# Patient Record
Sex: FEMALE | Race: WHITE | Employment: UNEMPLOYED | URBAN - METROPOLITAN AREA
[De-identification: names, ages, dates, MRNs, and addresses within clinical notes are randomized per-mention and may not be internally consistent; named-entity substitution may affect disease eponyms.]

---

## 2017-03-29 ENCOUNTER — HOSPITAL ENCOUNTER (OUTPATIENT)
Dept: ULTRASOUND IMAGING | Age: 52
Discharge: HOME | End: 2017-03-29
Attending: FAMILY MEDICINE
Payer: MEDICAID

## 2017-03-29 DIAGNOSIS — R10.9 ABDOMINAL PAIN: ICD-10-CM

## 2017-03-29 DIAGNOSIS — R14.0 ABDOMINAL DISTENTION: ICD-10-CM

## 2017-03-29 PROCEDURE — 76700 US EXAM ABDOM COMPLETE: CPT

## 2017-04-17 ENCOUNTER — TELEPHONE (OUTPATIENT)
Dept: SURGERY | Age: 52
End: 2017-04-17

## 2017-04-17 ENCOUNTER — OFFICE VISIT (OUTPATIENT)
Dept: SURGERY | Age: 52
End: 2017-04-17

## 2017-04-17 VITALS
BODY MASS INDEX: 37.17 KG/M2 | DIASTOLIC BLOOD PRESSURE: 91 MMHG | SYSTOLIC BLOOD PRESSURE: 130 MMHG | OXYGEN SATURATION: 95 % | HEART RATE: 96 BPM | RESPIRATION RATE: 18 BRPM | TEMPERATURE: 98.5 F | HEIGHT: 67 IN | WEIGHT: 236.8 LBS

## 2017-04-17 DIAGNOSIS — R10.30 LOWER ABDOMINAL PAIN: ICD-10-CM

## 2017-04-17 DIAGNOSIS — K80.20 GALLSTONES: Primary | ICD-10-CM

## 2017-04-17 RX ORDER — ALPRAZOLAM 2 MG/1
TABLET ORAL
Refills: 0 | COMMUNITY
Start: 2017-04-13 | End: 2018-03-06

## 2017-04-17 RX ORDER — HALOPERIDOL 10 MG/1
TABLET ORAL
Refills: 0 | COMMUNITY
Start: 2017-04-02 | End: 2018-03-06

## 2017-04-17 RX ORDER — METHYLPHENIDATE HYDROCHLORIDE 20 MG/1
TABLET ORAL
Refills: 0 | COMMUNITY
Start: 2017-03-16 | End: 2020-01-01

## 2017-04-17 NOTE — MR AVS SNAPSHOT
Visit Information Date & Time Provider Department Dept. Phone Encounter #  
 4/17/2017 10:00 AM Col[facility] Castellon [facility] [phone] [id number] Upcoming Health Maintenance Date Due DTaP/Tdap/Td series (1 - Tdap) 12/29/1987 PAP AKA CERVICAL CYTOLOGY 12/29/1987 INFLUENZA AGE 9 TO ADULT 8/1/2016 BREAST CANCER SCRN MAMMOGRAM 12/29/2016 FOBT Q 1 YEAR AGE 50-75 12/29/2016 Allergies as of 4/17/2017  Review Complete On: 4/17/2017 By: Vidhi Vargas Severity Noted Reaction Type Reactions Erythromycin  04/17/2017    Angioedema Current Immunizations  Never Reviewed No immunizations on file. Not reviewed this visit Vitals BP Pulse Temp Resp Height(growth percentile) Weight(growth percentile) (!) 130/91 (BP 1 Location: Right arm, BP Patient Position: Sitting) 96 98.5 °F (36.9 °C) (Oral) 18 5' 7\" (1.702 m) 236 lb 12.8 oz (107.4 kg) LMP SpO2 BMI OB Status Smoking Status 04/07/2017 (Approximate) 95% 37.09 kg/m2 Having regular periods Current Every Day Smoker BMI and BSA Data Body Mass Index Body Surface Area 37.09 kg/m 2 2.25 m 2 Your Updated Medication List  
  
   
This list is accurate as of: 4/17/17 10:54 AM.  Always use your most recent med list.  
  
  
  
  
 ABILIFY 9.75 mg/1.3 mL injection Generic drug:  ARIPiprazole  
by IntraMUSCular route daily. ALPRAZolam 2 mg tablet Commonly known as:  XANAX  
  
 haloperidol 10 mg tablet Commonly known as:  HALDOL  
take 2 tablets by mouth at bedtime  
  
 methylphenidate 20 mg tablet Commonly known as:  RITALIN  
take 1 tablet by mouth twice a day for 30 DAYS Patient Instructions If you have any questions or concerns about today's appointment, the verbal and/or written instructions you were given for follow up care, please call our office at [phone]. [facility] [place] [id number] [place] [address], [address] 
 
[phone] office [phone] fax Introducing [facility]! [employer] introduces [email or web address] patient portal. Now you can access parts of your medical record, email your doctor's office, and request medication refills online. 1. In your internet browser, go to [email or web address] [email or web address]. WiN MS/Ad Summos 2. Click on the First Time User? Click Here link in the Sign In box. You will see the New Member Sign Up page. 3. Enter your [email or web address] Access Code exactly as it appears below. You will not need to use this code after youve completed the sign-up process. If you do not sign up before the expiration date, you must request a new code. · [email or web address] Access Code: [email or web address] Expires: 7/16/2017  7:43 AM 
 
4. Enter the last four digits of your Social Security Number (xxxx) and Date of Birth (mm/dd/yyyy) as indicated and click Submit. You will be taken to the next sign-up page. 5. Create a [email or web address] ID. This will be your [email or web address] login ID and cannot be changed, so think of one that is secure and easy to remember. 6. Create a [email or web address] password. You can change your password at any time. 7. Enter your Password Reset Question and Answer. This can be used at a later time if you forget your password. 8. Enter your e-mail address. You will receive e-mail notification when new information is available in [address]. 9. Click Sign Up. You can now view and download portions of your medical record. 10. Click the Download Summary menu link to download a portable copy of your medical information. If you have questions, please visit the Frequently Asked Questions section of the [email or web address] website. Remember, [email or web address] is NOT to be used for urgent needs. For medical emergencies, dial 911. Now available from your iPhone and Android! Please provide this summary of care documentation to your next provider. Your primary care clinician is listed as PANKAJ FEE. If you have any questions after today's visit, please call [phone].

## 2017-04-17 NOTE — PROGRESS NOTES
General Surgery Consult    Cherry Dunlap  Admit date: (Not on file)    MRN: [id number]     : 1965     Age: 49 y.o. Attending Physician: Kathleen Escalera MD, [facility]      History of Present Illness:      Contreras Gaitan is a 49 y.o. female who is mentally retarded who is presenting with abdominal pain. When asked the patient denies any pain. She said she had some pain in the past but never in the right upper quadrant. He r pain happen about once a month and it seems to be either in lower abdomen or epigastric area. She denies nausea or vomiting. Her sister and care giver are with her. She had an U/S that showed gallstones but no sign of cholecystitis. There are no active problems to display for this patient. Past Medical History:   Diagnosis Date    Depression     Psychotic disorder       Past Surgical History:   Procedure Laterality Date    BREAST SURGERY PROCEDURE UNLISTED Bilateral 2000    BREAST AUGMENTATION      Social History   Substance Use Topics    Smoking status: Current Every Day Smoker     Types: Cigarettes    Smokeless tobacco: Never Used    Alcohol use Not on file      History   Smoking Status    Current Every Day Smoker    Types: Cigarettes   Smokeless Tobacco    Never Used     Family History   Problem Relation Age of Onset    Heart Disease Mother     Cancer Mother     Prostate Cancer Father     Cancer Father       Current Outpatient Prescriptions   Medication Sig    ALPRAZolam Alli Jaylon) 2 mg tablet     haloperidol (HALDOL) 10 mg tablet take 2 tablets by mouth at bedtime    methylphenidate (RITALIN) 20 mg tablet take 1 tablet by mouth twice a day for 30 DAYS    ARIPiprazole (ABILIFY) 9.75 mg/1.3 mL injection by IntraMUSCular route daily. No current facility-administered medications for this visit. Allergies   Allergen Reactions    Erythromycin Angioedema        Review of Systems:  Review of systems not obtained due to patient factors.     Objective: Visit Vitals    BP (!) 130/91 (BP 1 Location: Right arm, BP Patient Position: Sitting)    Pulse 96    Temp 98.5 °F (36.9 °C) (Oral)    Resp 18    Ht 5' 7\" (1.702 m)    Wt 107.4 kg (236 lb 12.8 oz)    LMP 04/07/2017 (Approximate)    SpO2 95%    BMI 37.09 kg/m2       Physical Exam:      General:  in no apparent distress   Eyes:  conjunctivae and sclerae normal, pupils equal, round, reactive to light   Throat & Neck: no erythema or exudates noted and neck supple and symmetrical; no palpable masses   Lungs:   clear to auscultation bilaterally   Heart:  Regular rate and rhythm   Abdomen:   rounded and obese, soft, nontender, nondistended, no masses or organomegaly. No hernias. Extremities: extremities normal, atraumatic, no cyanosis or edema   Skin: Normal.       Imaging and Lab Review:     CBC: No results found for: WBC, RBC, HGB, HCT, PLT, HGBEXT, HCTEXT, PLTEXT  BMP: No results found for: GLU, NA, K, CL, CO2, BUN, CREA, CA  CMP:No results found for: GLU, NA, K, CL, CO2, BUN, CREA, CA, AGAP, BUCR, TBIL, GPT, AP, TP, ALB, GLOB, AGRAT    No results found for this or any previous visit (from the past 24 hour(s)). images and reports reviewed    Assessment:   Amelia Sullivan is a 49 y.o. female is presenting with abdominal pain that is very non-specific and very occasional. I don't think her pain is related to her gallstones. Her U/S did not show any evidence of cholecystitis. I think it is safer not to do the surgery. We can do a HIDA scan if we are still suspicious of that.     Plan:     No need for any surgical intervention currently  If pain continue and is in the right upper quadrant, than we can order a HIDA scan  Follow up with me as needed     Please call me if you have any questions (cell phone: [phone])     Signed By: Vicky Iyer MD     April 17, 2017

## 2017-04-17 NOTE — PATIENT INSTRUCTIONS
If you have any questions or concerns about today's appointment, the verbal and/or written instructions you were given for follow up care, please call our office at [phone].     [facility] - [address]    [phone] office  [phone] fax

## 2017-04-17 NOTE — PROGRESS NOTES
Patient is a 49 y.o. female who presents for a surgical evaluation of cholelithiasis, however, patient denies any abdominal pain. Patient is accompanied by her in home caretaker & sister for today's visit.

## 2017-04-17 NOTE — LETTER
4/17/2017 10:30 AM 
 
Patient:  Abdias Iqbal YOB: 1965 Date of Visit: 4/17/2017 Darshana Roberts MD 
[address] [facility] [address] [address] VIA Facsimile: [phone] Dear Darshana Roberts MD, Thank you for referring Ms. Jayna Dunlap to [place] [address] for evaluation and treatment. Below are the relevant portions of my assessment and plan of care. Thank you very much for your referral of Ms. Jayna Dunlap. If you have questions, please do not hesitate to call me. I look forward to following Ms. Dunlap along with you and will keep you updated as to her progress. Sincerely, Tino rCuz MD

## 2018-01-12 ENCOUNTER — APPOINTMENT (OUTPATIENT)
Dept: GENERAL RADIOLOGY | Age: 53
End: 2018-01-12
Attending: EMERGENCY MEDICINE
Payer: MEDICAID

## 2018-01-12 ENCOUNTER — HOSPITAL ENCOUNTER (EMERGENCY)
Age: 53
Discharge: HOME | End: 2018-01-12
Attending: EMERGENCY MEDICINE
Payer: MEDICAID

## 2018-01-12 VITALS
DIASTOLIC BLOOD PRESSURE: 103 MMHG | HEART RATE: 92 BPM | RESPIRATION RATE: 16 BRPM | SYSTOLIC BLOOD PRESSURE: 148 MMHG | TEMPERATURE: 98.3 F | OXYGEN SATURATION: 96 %

## 2018-01-12 DIAGNOSIS — S62.642A CLOSED NONDISPLACED FRACTURE OF PROXIMAL PHALANX OF RIGHT MIDDLE FINGER, INITIAL ENCOUNTER: Primary | ICD-10-CM

## 2018-01-12 PROCEDURE — 99283 EMERGENCY DEPT VISIT LOW MDM: CPT

## 2018-01-12 PROCEDURE — 73140 X-RAY EXAM OF FINGER(S): CPT

## 2018-01-12 PROCEDURE — 77030008323 HC SPLNT FNGR GTR DJOR -A

## 2018-01-12 PROCEDURE — 74011250637 HC RX REV CODE- 250/637: Performed by: EMERGENCY MEDICINE

## 2018-01-12 RX ORDER — ZOLPIDEM TARTRATE 10 MG/1
5 TABLET ORAL
COMMUNITY
End: 2018-03-06

## 2018-01-12 RX ORDER — HYDROCODONE BITARTRATE AND ACETAMINOPHEN 5; 325 MG/1; MG/1
1 TABLET ORAL
Status: COMPLETED | OUTPATIENT
Start: 2018-01-12 | End: 2018-01-12

## 2018-01-12 RX ORDER — TRAMADOL HYDROCHLORIDE 50 MG/1
50 TABLET ORAL
Qty: 6 TAB | Refills: 0 | Status: SHIPPED | OUTPATIENT
Start: 2018-01-12 | End: 2018-03-06

## 2018-01-12 RX ADMIN — HYDROCODONE BITARTRATE AND ACETAMINOPHEN 1 TABLET: 5; 325 TABLET ORAL at 20:00

## 2018-01-13 NOTE — DISCHARGE INSTRUCTIONS
Finger Fracture: Care Instructions  Your Care Instructions    Breaks in the bones of the finger usually heal well in about 3 to 4 weeks. The pain and swelling from a broken finger can last for weeks. But it should steadily improve, starting a few days after you break it. It is very important that you wear and take care of the cast or splint exactly as your doctor tells you to so that your finger heals properly and does not end up crooked. Wearing a splint may interfere with your normal activities. Ask for help with daily tasks if you need it. You heal best when you take good care of yourself. Eat a variety of healthy foods, and don't smoke. Follow-up care is a key part of your treatment and safety. Be sure to make and go to all appointments, and call your doctor if you are having problems. It's also a good idea to know your test results and keep a list of the medicines you take. How can you care for yourself at home? · If your doctor put a splint on your finger, wear the splint exactly as directed. Do not remove it until your doctor says that you can. · Keep your hand raised above the level of your heart as much as you can. This will help reduce swelling. · Put ice or a cold pack on your finger for 10 to 20 minutes at a time. Try to do this every 1 to 2 hours for the next 3 days (when you are awake) or until the swelling goes down. Put a thin cloth between the ice and your skin. Keep the splint dry. · Be safe with medicines. Take pain medicines exactly as directed. ¨ If the doctor gave you a prescription medicine for pain, take it as prescribed. ¨ If you are not taking a prescription pain medicine, ask your doctor if you can take an over-the-counter medicine. When should you call for help? Call 911 anytime you think you may need emergency care. For example, call if:  ? · Your finger is cool or pale or changes color.    ?Call your doctor now or seek immediate medical care if:  ? · Your pain gets much worse.   ? · You have tingling, weakness, or numbness in your finger. ? · You have signs of infection, such as:  ¨ Increased pain, swelling, warmth, or redness. ¨ Red streaks leading from the area. ¨ Pus draining from the area. ¨ Swollen lymph nodes in your neck, armpits, or groin. ¨ A fever. ? Watch closely for changes in your health, and be sure to contact your doctor if:  ? · Your finger is not steadily improving. Where can you learn more? Go to http://margaret-ilene.info/. Enter Q394 in the search box to learn more about \"Finger Fracture: Care Instructions. \"  Current as of: March 21, 2017  Content Version: 11.4  © 6427-4868 [employer]. Care instructions adapted under license by [employer] (which disclaims liability or warranty for this information). If you have questions about a medical condition or this instruction, always ask your healthcare professional. [place] [address] any warranty or liability for your use of this information.

## 2018-01-13 NOTE — ED PROVIDER NOTES
EMERGENCY DEPARTMENT HISTORY AND PHYSICAL EXAM    7:53 PM      Date: 1/12/2018  Patient Name: [facility]    History of Presenting Illness     Chief Complaint   Patient presents with    Finger Pain         History Provided By: Patient    Chief Complaint: finger pain   Duration: several  Minutes PTA  Timing:  Constant  Location: right third digit   Quality: Aching  Severity: Moderate  Associated Symptoms: denies any other associated signs or symptoms      Additional History (Context): Candi Fabry Summs, a 47 y.o. Female, current 2ppd smoker, non-substance abuser with h/o depression, presents with constant, moderate severity, aching right third digit pain s/p landing on her finger after a mechanical fall just PTA. Fall occurred due to tripping. Pt does not have associated sx. She has not tried OTC medications PTA. No other complaints at this time. PCP: Richard Umaña MD    Current Outpatient Prescriptions   Medication Sig Dispense Refill    zolpidem (AMBIEN) 10 mg tablet Take 5 mg by mouth nightly as needed for Sleep.  traMADol (ULTRAM) 50 mg tablet Take 1 Tab by mouth every six (6) hours as needed for Pain. Max Daily Amount: 200 mg. 6 Tab 0    ALPRAZolam (XANAX) 2 mg tablet   0    haloperidol (HALDOL) 10 mg tablet take 2 tablets by mouth at bedtime  0    methylphenidate (RITALIN) 20 mg tablet take 1 tablet by mouth twice a day for 30 DAYS  0    ARIPiprazole (ABILIFY) 9.75 mg/1.3 mL injection by IntraMUSCular route daily.          Past History     Past Medical History:  Past Medical History:   Diagnosis Date    Depression     Psychotic disorder        Past Surgical History:  Past Surgical History:   Procedure Laterality Date    BREAST SURGERY PROCEDURE UNLISTED Bilateral 2000    BREAST AUGMENTATION       Family History:  Family History   Problem Relation Age of Onset    Heart Disease Mother     Cancer Mother     Prostate Cancer Father     Cancer Father        Social History:  Social History Substance Use Topics    Smoking status: Current Every Day Smoker     Packs/day: 2.00     Types: Cigarettes    Smokeless tobacco: Never Used    Alcohol use No       Allergies: Allergies   Allergen Reactions    Erythromycin Angioedema         Review of Systems     Review of Systems   Constitutional: Negative for chills and fever. HENT: Negative for ear pain and sore throat. Eyes: Negative for pain and visual disturbance. Respiratory: Negative for cough and shortness of breath. Cardiovascular: Negative for chest pain and palpitations. Gastrointestinal: Negative for abdominal pain, diarrhea, nausea and vomiting. Genitourinary: Negative for flank pain. Musculoskeletal: Negative for back pain and neck pain. Right third digit pain   No swelling   Neurological: Negative for syncope and headaches. Psychiatric/Behavioral: Negative for agitation. The patient is not nervous/anxious. All other systems reviewed and are negative. Physical Exam     Visit Vitals    BP (!) 148/103 (BP 1 Location: Left arm, BP Patient Position: At rest)    Pulse 92    Temp 98.3 °F (36.8 °C)    Resp 16    LMP 12/22/2017    SpO2 96%       Physical Exam   Constitutional: She appears well-developed and well-nourished. HENT:   Head: Normocephalic and atraumatic. Eyes: Conjunctivae are normal. No scleral icterus. Neck: Normal range of motion. Neck supple. No JVD present. Cardiovascular: Normal rate, regular rhythm and intact distal pulses. Neurovascularly intact right third digit    Pulmonary/Chest: Effort normal. No respiratory distress. Musculoskeletal: Normal range of motion. pain to proximal phalanx right 3rd digit  neurovascularly intact     Neurological: She is alert. Skin: Skin is warm and dry. Psychiatric: Judgment and thought content normal.   Nursing note and vitals reviewed.     Diagnostic Study Results     Radiologic Studies -   XR 3RD FINGER RT MIN 2 V    (Results Pending)     XR 3RD FINGER RT MIN 2 V  --------------------------------------------------------------------  Impression by Dr. Jeannette Vasques at 8:30 PM  3rd digit, proximinal phalanx ; longitudinal and oblique fracture    Medical Decision Making   I am the first provider for this patient. I reviewed the vital signs, available nursing notes, past medical history, past surgical history, family history and social history. Vital Signs-Reviewed the patient's vital signs. O2 saturation of 96% on room air---Non-hypoxic     ED Course: Progress Notes, Reevaluation, and Consults:  Provider Notes (Medical Decision Making):  will get film to evaluate for fracture, possible sprain      9:01 PM fracture seen, discussed with pt, will place splint, tramadol #6 for pain.  reviewed, no narcotics, but takes xanax 4 mg daily and ambien 10 mg. So use caution with narcotics. referal to ortho. Questions answered and they're happy with the plan. Diagnosis     Clinical Impression:   1. Closed nondisplaced fracture of proximal phalanx of right middle finger, initial encounter        Disposition: home    Follow-up Information     Follow up With Details [id number]   [address] [place] [address] [phone]             Patient's Medications   Start Taking    TRAMADOL (ULTRAM) 50 MG TABLET    Take 1 Tab by mouth every six (6) hours as needed for Pain. Max Daily Amount: 200 mg. Continue Taking    ALPRAZOLAM (XANAX) 2 MG TABLET        ARIPIPRAZOLE (ABILIFY) 9.75 MG/1.3 ML INJECTION    by IntraMUSCular route daily. HALOPERIDOL (HALDOL) 10 MG TABLET    take 2 tablets by mouth at bedtime    METHYLPHENIDATE (RITALIN) 20 MG TABLET    take 1 tablet by mouth twice a day for 30 DAYS    ZOLPIDEM (AMBIEN) 10 MG TABLET    Take 5 mg by mouth nightly as needed for Sleep.    These Medications have changed    No medications on file   Stop Taking    No medications on file _______________________________    Attestations:  Scribe Attestation     Helena Minerva acting as a scribe for and in the presence of Cliff Ariza MD      January 12, 2018 at 7:53 PM       Provider Attestation:      I personally performed the services described in the documentation, reviewed the documentation, as recorded by the scribe in my presence, and it accurately and completely records my words and actions.  January 12, 2018 at 7:53 PM - Cliff Ariza MD    _______________________________

## 2018-03-03 ENCOUNTER — HOSPITAL ENCOUNTER (EMERGENCY)
Age: 53
Discharge: ANOTHER HOSPITAL | End: 2018-03-04
Attending: EMERGENCY MEDICINE
Payer: MEDICAID

## 2018-03-03 DIAGNOSIS — F13.20: ICD-10-CM

## 2018-03-03 DIAGNOSIS — F25.9 SCHIZOAFFECTIVE DISORDER, UNSPECIFIED TYPE (HCC): Primary | ICD-10-CM

## 2018-03-03 LAB
ANION GAP SERPL CALC-SCNC: 6 MMOL/L (ref 3–18)
BASOPHILS # BLD: 0.1 K/UL (ref 0–0.06)
BASOPHILS NFR BLD: 0 % (ref 0–2)
BUN SERPL-MCNC: 11 MG/DL (ref 7–18)
BUN/CREAT SERPL: 14 (ref 12–20)
CALCIUM SERPL-MCNC: 8.7 MG/DL (ref 8.5–10.1)
CHLORIDE SERPL-SCNC: 106 MMOL/L (ref 100–108)
CO2 SERPL-SCNC: 28 MMOL/L (ref 21–32)
CREAT SERPL-MCNC: 0.77 MG/DL (ref 0.6–1.3)
DIFFERENTIAL METHOD BLD: ABNORMAL
EOSINOPHIL # BLD: 0.3 K/UL (ref 0–0.4)
EOSINOPHIL NFR BLD: 2 % (ref 0–5)
ERYTHROCYTE [DISTWIDTH] IN BLOOD BY AUTOMATED COUNT: 12.6 % (ref 11.6–14.5)
ETHANOL SERPL-MCNC: <3 MG/DL (ref 0–3)
GLUCOSE SERPL-MCNC: 121 MG/DL (ref 74–99)
HCT VFR BLD AUTO: 46.3 % (ref 35–45)
HGB BLD-MCNC: 16.2 G/DL (ref 12–16)
LYMPHOCYTES # BLD: 3.8 K/UL (ref 0.9–3.6)
LYMPHOCYTES NFR BLD: 31 % (ref 21–52)
MCH RBC QN AUTO: 33.3 PG (ref 24–34)
MCHC RBC AUTO-ENTMCNC: 35 G/DL (ref 31–37)
MCV RBC AUTO: 95.1 FL (ref 74–97)
MONOCYTES # BLD: 0.8 K/UL (ref 0.05–1.2)
MONOCYTES NFR BLD: 7 % (ref 3–10)
NEUTS SEG # BLD: 7.2 K/UL (ref 1.8–8)
NEUTS SEG NFR BLD: 60 % (ref 40–73)
PLATELET # BLD AUTO: 237 K/UL (ref 135–420)
PMV BLD AUTO: 11.1 FL (ref 9.2–11.8)
POTASSIUM SERPL-SCNC: 3.9 MMOL/L (ref 3.5–5.5)
RBC # BLD AUTO: 4.87 M/UL (ref 4.2–5.3)
SODIUM SERPL-SCNC: 140 MMOL/L (ref 136–145)
WBC # BLD AUTO: 12.1 K/UL (ref 4.6–13.2)

## 2018-03-03 PROCEDURE — 99285 EMERGENCY DEPT VISIT HI MDM: CPT

## 2018-03-03 PROCEDURE — 74011250637 HC RX REV CODE- 250/637: Performed by: PHYSICIAN ASSISTANT

## 2018-03-03 PROCEDURE — 85025 COMPLETE CBC W/AUTO DIFF WBC: CPT

## 2018-03-03 PROCEDURE — 80048 BASIC METABOLIC PNL TOTAL CA: CPT

## 2018-03-03 PROCEDURE — 80307 DRUG TEST PRSMV CHEM ANLYZR: CPT

## 2018-03-03 RX ORDER — IBUPROFEN 200 MG
1 TABLET ORAL DAILY
Status: DISCONTINUED | OUTPATIENT
Start: 2018-03-03 | End: 2018-03-04 | Stop reason: HOSPADM

## 2018-03-03 RX ORDER — LORAZEPAM 1 MG/1
2 TABLET ORAL
Status: DISCONTINUED | OUTPATIENT
Start: 2018-03-03 | End: 2018-03-04 | Stop reason: HOSPADM

## 2018-03-03 NOTE — ED PROVIDER NOTES
HPI Comments: Patient is a 48 y/o female w/ PMH Schizoaffective D/O, depression, who presents to the ER via EMS requesting detox from Xanax. Per EMS, pt verbalized suicidal ideations while transporting. Patient states she took 5-6 Xanax pills just prior to calling EMS. Patient states she takes her medications too often, just to try and sleep more. Patient denied any SI/HI on exam, and has no other physical complaints. Patient states she is taking her other psych meds as prescribed. She has no pain. She denied any drugs or alcohol and has no other symptoms or complaints. The history is provided by the patient. Past Medical History:   Diagnosis Date    Depression     Psychotic disorder        Past Surgical History:   Procedure Laterality Date    BREAST SURGERY PROCEDURE UNLISTED Bilateral 2000    BREAST AUGMENTATION         Family History:   Problem Relation Age of Onset    Heart Disease Mother     Cancer Mother     Prostate Cancer Father     Cancer Father        Social History     Social History    Marital status: SINGLE     Spouse name: N/A    Number of children: N/A    Years of education: N/A     Occupational History    Not on file. Social History Main Topics    Smoking status: Current Every Day Smoker     Packs/day: 2.00     Types: Cigarettes    Smokeless tobacco: Never Used    Alcohol use No    Drug use: No    Sexual activity: Not on file     Other Topics Concern    Not on file     Social History Narrative         ALLERGIES: Erythromycin    Review of Systems   Constitutional: Negative for chills, fatigue and fever. HENT: Negative. Negative for sore throat. Eyes: Negative. Respiratory: Negative for cough and shortness of breath. Cardiovascular: Negative for chest pain and palpitations. Gastrointestinal: Negative for abdominal pain, nausea and vomiting. Genitourinary: Negative for dysuria. Musculoskeletal: Negative. Skin: Negative.     Neurological: Negative for dizziness, weakness, light-headedness and headaches. Psychiatric/Behavioral:        Requesting detox for xanax abuse   All other systems reviewed and are negative. Vitals:    03/03/18 1915 03/03/18 1930 03/03/18 1945 03/03/18 2000   BP: 117/82 135/82 124/86 150/72   Pulse:       Resp:       Temp:       SpO2: 94% 93% 93% 93%            Physical Exam   Constitutional: She is oriented to person, place, and time. She appears well-developed and well-nourished. No distress. Pt very slow to respond, withdrawn   HENT:   Head: Normocephalic and atraumatic. Mouth/Throat: Oropharynx is clear and moist.   Eyes: Conjunctivae are normal. Pupils are equal, round, and reactive to light. No scleral icterus. Neck: Neck supple. No JVD present. No tracheal deviation present. Cardiovascular: Normal rate, regular rhythm and normal heart sounds. Pulmonary/Chest: Effort normal and breath sounds normal. No respiratory distress. She has no wheezes. Abdominal: Soft. There is no tenderness. Musculoskeletal: Normal range of motion. Neurological: She is alert and oriented to person, place, and time. She has normal strength. Gait normal. GCS eye subscore is 4. GCS verbal subscore is 5. GCS motor subscore is 6. Skin: Skin is warm and dry. She is not diaphoretic. Psychiatric: Her speech is delayed and slurred. She is withdrawn. Thought content is not paranoid and not delusional. She exhibits a depressed mood. She expresses no homicidal and no suicidal ideation. She expresses no suicidal plans and no homicidal plans. Nursing note and vitals reviewed. MDM  Number of Diagnoses or Management Options  Schizoaffective disorder, unspecified type ([facility] [address]):   Sedative addiction [facility]):   Diagnosis management comments: 4:15 PM  46 y/o female c/o xanax addiction; requesting detox and help quitting. States she takes more than prescribed to help her sleep.   Admits to taking 5-6 tabs about 1 hour prior to ER arrival.  Hx of Bipolar and schizophrenia, on her medications. Denied any SI/HI with hospital staff, however per EMS pt admitted to [facility] during transport. Pt very slow to respond and withdrawn during exam.  No physical complaints. Will plan on labs, UDS and telepsych consult. Marbella Gonzalez PA-C    6:39 PM  Per telepsych, recommends inpatient admission. Advised to hold ambien, xanax and adderal.  Haldol as prescribed. Monitor pt for withdrawal symptoms. She is voluntary at this time. If patient changes her mind about voluntary admission, per psych, recommends CSB evaluatin if needed. Call placed to [facility]; awaiting call back. Marbella Gonzalez PA-C    11:19 PM  Pt resting at bedside w/ no complaints at this time. Still awaiting follow up from [address] [place], [place] [address]GABRIEL    1:21 AM   Pt care transferred to Dr. Milton Huang provider. History of patient complaint(s), available diagnostic reports and current treatment plan has been discussed thoroughly. Intended disposition of patient : awaiting assignment/possible admission to [facility]. Called and spoke with Rosi magana, who was going to look into pts case/chart and further evaluate.   Pending diagnostics reports and/or labs (please list): None  Marbella Gonzalez PA-C          Clinical Impression:  Schizoaffectiver D/O, sedatives addiction         Amount and/or Complexity of Data Reviewed  Clinical lab tests: ordered and reviewed    Risk of Complications, Morbidity, and/or Mortality  Presenting problems: moderate  Diagnostic procedures: moderate  Management options: moderate    Patient Progress  Patient progress: stable        ED Course       Procedures           Vitals:  Patient Vitals for the past 12 hrs:   Temp Pulse Resp BP SpO2   03/03/18 2000 - - - 150/72 93 %   03/03/18 1945 - - - 124/86 93 %   03/03/18 1930 - - - 135/82 93 %   03/03/18 1915 - - - 117/82 94 %   03/03/18 1815 - 98 - 140/84 95 %   03/03/18 1800 - 90 - 116/79 95 % 03/03/18 1730 - 91 - 121/65 96 %   03/03/18 1700 - 91 - 123/72 94 %   03/03/18 1615 98.6 °F (37 °C) 90 14 123/79 100 %         Medications ordered:   Medications   nicotine (NICODERM CQ) 14 mg/24 hr patch 1 Patch (1 Patch TransDERmal Apply Patch 3/3/18 2033)   LORazepam (ATIVAN) tablet 2 mg (not administered)   diphenhydrAMINE (BENADRYL) capsule 25 mg (not administered)         Lab findings:  Recent Results (from the past 12 hour(s))   CBC WITH AUTOMATED DIFF    Collection Time: 03/03/18  4:41 PM   Result Value Ref Range    WBC 12.1 4.6 - 13.2 K/uL    RBC 4.87 4.20 - 5.30 M/uL    HGB 16.2 (H) 12.0 - 16.0 g/dL    HCT 46.3 (H) 35.0 - 45.0 %    MCV 95.1 74.0 - 97.0 FL    MCH 33.3 24.0 - 34.0 PG    MCHC 35.0 31.0 - 37.0 g/dL    RDW 12.6 11.6 - 14.5 %    PLATELET 999 790 - 387 K/uL    MPV 11.1 9.2 - 11.8 FL    NEUTROPHILS 60 40 - 73 %    LYMPHOCYTES 31 21 - 52 %    MONOCYTES 7 3 - 10 %    EOSINOPHILS 2 0 - 5 %    BASOPHILS 0 0 - 2 %    ABS. NEUTROPHILS 7.2 1.8 - 8.0 K/UL    ABS. LYMPHOCYTES 3.8 (H) 0.9 - 3.6 K/UL    ABS. MONOCYTES 0.8 0.05 - 1.2 K/UL    ABS. EOSINOPHILS 0.3 0.0 - 0.4 K/UL    ABS.  BASOPHILS 0.1 (H) 0.0 - 0.06 K/UL    DF AUTOMATED     METABOLIC PANEL, BASIC    Collection Time: 03/03/18  4:41 PM   Result Value Ref Range    Sodium 140 136 - 145 mmol/L    Potassium 3.9 3.5 - 5.5 mmol/L    Chloride 106 100 - 108 mmol/L    CO2 28 21 - 32 mmol/L    Anion gap 6 3.0 - 18 mmol/L    Glucose 121 (H) 74 - 99 mg/dL    BUN 11 7.0 - 18 MG/DL    Creatinine 0.77 0.6 - 1.3 MG/DL    BUN/Creatinine ratio 14 12 - 20      GFR est AA >60 >60 ml/min/1.73m2    GFR est non-AA >60 >60 ml/min/1.73m2    Calcium 8.7 8.5 - 10.1 MG/DL   ETHYL ALCOHOL    Collection Time: 03/03/18  4:41 PM   Result Value Ref Range    ALCOHOL(ETHYL),SERUM <3 0 - 3 MG/DL       EKG interpretation by ED Physician:      X-Ray, CT or other radiology findings or impressions:  No orders to display       Progress notes, Consult notes or additional Procedure notes:       Reevaluation of patient:       Disposition:  Diagnosis:   1. Schizoaffective disorder, unspecified type ([facility]    2. Sedative addiction ([facility]        Disposition: Anticipate Admission/psych placement     Follow-up Information     None           Patient's Medications   Start Taking    No medications on file   Continue Taking    ALPRAZOLAM (XANAX) 2 MG TABLET        ARIPIPRAZOLE (ABILIFY) 9.75 MG/1.3 ML INJECTION    by IntraMUSCular route daily. HALOPERIDOL (HALDOL) 10 MG TABLET    take 2 tablets by mouth at bedtime    METHYLPHENIDATE (RITALIN) 20 MG TABLET    take 1 tablet by mouth twice a day for 30 DAYS    TRAMADOL (ULTRAM) 50 MG TABLET    Take 1 Tab by mouth every six (6) hours as needed for Pain. Max Daily Amount: 200 mg. ZOLPIDEM (AMBIEN) 10 MG TABLET    Take 5 mg by mouth nightly as needed for Sleep.    These Medications have changed    No medications on file   Stop Taking    No medications on file

## 2018-03-03 NOTE — CONSULTS
Chief Complaint: Telepsychiatry consultation   Location of patient: [facility]   Location of doctor: [place]   This evaluation was conducted via Telepsychiatry with the assistance of onsite staff. History of Present Illness: This pt is a 47 yr old female who reports a psychiatric history of Schizoaffective disorder. She presented to the ED via EMS apparently requesting detox from Xanax. in the ED she denied SI but per [facility] staff she possibly endorsing SI while being transported to the hospital.     Telepsychiatry was consulted for assessment and recommendations for management. Upon interview pt has somewhat slurred and slow speech, short responses to questioning but is otherwise able to answer questions appropriately. She says that today she called the ambulance to come to the hospital after she took too many Xanax. She reports that she normally takes total of 4 pills of Xanax, 0.5mg , daily and today she took 5 pills. She says that her intention was to 'nap' and not to kill herself but then she decided to come to the hospital to get help. She denies hx of suicide attempts in the past.     She is requesting admission looking to get off of Xanax. She reports a psych history of Schizoaffective disorder, she has a psychiatrist and last hospital stay was 6 yrs ago as per pt. She says that at [facility] she takes [place], Ritalin, haldol and abilify injection. Most recent vitals are /79 and pulse 90. She denies hx of seizures. She is AAOx3. Collateral: see HPI     Psychiatric History/Treatment History:   -Inpatient: yes, last time 6 years ago per pt   -Outpatient: yes   -History of suicide attempts: pt denies     Drug/Alcohol History: UDS not on file. Pt reports that she does not drink and does not use drugs. She has never been to rehab or detox before     Medical History:   -Medical problems: pt reports that hse has urinary incontinence. She denies hx of seizures   -Current medications: see chart.  Meds listed in chart were reviewed and confirmed with pt - Haldol, Ritalin, ambien, abilify injection, Xanax   -Medication Allergies: erythromycin     Family Psychiatric History: unknown     Social History: pt reports that she lives with her boyfriend   Strength/supports: boyfriend     Mental Status Exam:   Appearance and attire: hospital attire, lying in bed   Attitude and behavior: cooperative, calm   Speech: clear, coherent, slow, short answers, somewhat slurred   Affect and mood: blunted   Association and thought processes: goal directed   Thought content: no overt delusions. denies SI and HI but may have endorsed SI while on the way to the hospital   Perception: pt does not appear to be responding to internal stimuli. Sensorium, memory, and orientation: AAOx3   Intellectual functioning: unable to assess   Insight and judgment: fair     Diagnosis:   Schizoaffective disorder by hx   Consider anxiolytic use disorder     Impression/Risk Assessment/Treatment Recommendations:   -Recommended level of care: The patient is a 47 yr old female who reports a hx of Schizoaffective disorder who presented to the ED via ambulance after reporting that she had taken too many Xanax pills. Upon transport to the hospital she supposedly endorsed suicidal thoughts. Pt is requesting treatment. The patient is thus an acute danger to self and requires inpatient psychiatric hospitalization for stabilization and treatment. Recommend admission under voluntary status once medically cleared , as pt agrees to hospitalization and treatment and has capacity to make this decision. If she should change her mind and refuse voluntary admission then she should NOT be discharged -she should be screened for commitment by the [employer].      -Recommended pharmacology:   Recommend starting a benzo detox protocol: seizure precautions, vitals with CIWA q4hrs, Ativan 2mg PO q4hrs prn for sx of withdrawal as based on elevated vitals/CIWA scores (hold Ativan for somnolence, ataxia or dysarthria). Hold her ambien , Xanax and Ritalin. May continue Haldol if the dose and medication are confirmed by pharmacy. Further recommendations regarding psychotropic mediations will be made by the inpt psychiatry treatment team once the pt is admitted there.      Pt expressed agreement with this plan and case discussed with ED treatment team.     Miracle Self MD  Telepsychiatry

## 2018-03-04 ENCOUNTER — HOSPITAL ENCOUNTER (INPATIENT)
Age: 53
LOS: 2 days | Discharge: HOME | End: 2018-03-06
Attending: STUDENT IN AN ORGANIZED HEALTH CARE EDUCATION/TRAINING PROGRAM | Admitting: STUDENT IN AN ORGANIZED HEALTH CARE EDUCATION/TRAINING PROGRAM
Payer: MEDICAID

## 2018-03-04 VITALS
TEMPERATURE: 98.7 F | SYSTOLIC BLOOD PRESSURE: 137 MMHG | OXYGEN SATURATION: 99 % | DIASTOLIC BLOOD PRESSURE: 72 MMHG | HEART RATE: 93 BPM | RESPIRATION RATE: 18 BRPM

## 2018-03-04 DIAGNOSIS — F41.9 ANXIETY: ICD-10-CM

## 2018-03-04 DIAGNOSIS — F90.2 ATTENTION DEFICIT HYPERACTIVITY DISORDER (ADHD), COMBINED TYPE: Primary | ICD-10-CM

## 2018-03-04 LAB
AMPHET UR QL SCN: NEGATIVE
BARBITURATES UR QL SCN: NEGATIVE
BENZODIAZ UR QL: POSITIVE
CANNABINOIDS UR QL SCN: NEGATIVE
COCAINE UR QL SCN: POSITIVE
HDSCOM,HDSCOM: ABNORMAL
METHADONE UR QL: NEGATIVE
OPIATES UR QL: NEGATIVE
PCP UR QL: NEGATIVE

## 2018-03-04 PROCEDURE — 74011250637 HC RX REV CODE- 250/637: Performed by: EMERGENCY MEDICINE

## 2018-03-04 PROCEDURE — 80307 DRUG TEST PRSMV CHEM ANLYZR: CPT

## 2018-03-04 PROCEDURE — 74011250637 HC RX REV CODE- 250/637: Performed by: PHYSICIAN ASSISTANT

## 2018-03-04 PROCEDURE — 74011250637 HC RX REV CODE- 250/637: Performed by: STUDENT IN AN ORGANIZED HEALTH CARE EDUCATION/TRAINING PROGRAM

## 2018-03-04 PROCEDURE — 65220000003 HC RM SEMIPRIVATE PSYCH

## 2018-03-04 PROCEDURE — 74011250637 HC RX REV CODE- 250/637: Performed by: PSYCHIATRY & NEUROLOGY

## 2018-03-04 RX ORDER — LORAZEPAM 1 MG/1
1-2 TABLET ORAL
Status: DISCONTINUED | OUTPATIENT
Start: 2018-03-04 | End: 2018-03-06 | Stop reason: HOSPADM

## 2018-03-04 RX ORDER — DIPHENHYDRAMINE HCL 25 MG
25 CAPSULE ORAL
Status: COMPLETED | OUTPATIENT
Start: 2018-03-04 | End: 2018-03-04

## 2018-03-04 RX ORDER — LORAZEPAM 2 MG/ML
1-2 INJECTION INTRAMUSCULAR
Status: DISCONTINUED | OUTPATIENT
Start: 2018-03-04 | End: 2018-03-06 | Stop reason: HOSPADM

## 2018-03-04 RX ORDER — CLONAZEPAM 0.5 MG/1
0.5 TABLET ORAL 2 TIMES DAILY
Status: DISCONTINUED | OUTPATIENT
Start: 2018-03-04 | End: 2018-03-06 | Stop reason: HOSPADM

## 2018-03-04 RX ORDER — HALOPERIDOL 5 MG/ML
5 INJECTION INTRAMUSCULAR
Status: DISCONTINUED | OUTPATIENT
Start: 2018-03-04 | End: 2018-03-06 | Stop reason: HOSPADM

## 2018-03-04 RX ORDER — IBUPROFEN 200 MG
1 TABLET ORAL DAILY
Status: DISCONTINUED | OUTPATIENT
Start: 2018-03-05 | End: 2018-03-04

## 2018-03-04 RX ORDER — HALOPERIDOL 5 MG/1
5 TABLET ORAL
Status: DISCONTINUED | OUTPATIENT
Start: 2018-03-04 | End: 2018-03-06 | Stop reason: HOSPADM

## 2018-03-04 RX ORDER — TRAZODONE HYDROCHLORIDE 50 MG/1
50 TABLET ORAL
Status: DISCONTINUED | OUTPATIENT
Start: 2018-03-04 | End: 2018-03-06 | Stop reason: HOSPADM

## 2018-03-04 RX ORDER — BENZTROPINE MESYLATE 1 MG/1
1-2 TABLET ORAL
Status: DISCONTINUED | OUTPATIENT
Start: 2018-03-04 | End: 2018-03-06 | Stop reason: HOSPADM

## 2018-03-04 RX ORDER — IBUPROFEN 200 MG
1 TABLET ORAL DAILY
Status: DISCONTINUED | OUTPATIENT
Start: 2018-03-04 | End: 2018-03-06 | Stop reason: HOSPADM

## 2018-03-04 RX ORDER — IBUPROFEN 600 MG/1
600 TABLET ORAL
Status: DISCONTINUED | OUTPATIENT
Start: 2018-03-04 | End: 2018-03-06 | Stop reason: HOSPADM

## 2018-03-04 RX ORDER — LORAZEPAM 1 MG/1
1 TABLET ORAL SEE ADMIN INSTRUCTIONS
Status: DISCONTINUED | OUTPATIENT
Start: 2018-03-04 | End: 2018-03-04 | Stop reason: HOSPADM

## 2018-03-04 RX ORDER — HALOPERIDOL 5 MG/1
10 TABLET ORAL
Status: DISCONTINUED | OUTPATIENT
Start: 2018-03-04 | End: 2018-03-06 | Stop reason: HOSPADM

## 2018-03-04 RX ORDER — ACETAMINOPHEN 325 MG/1
650 TABLET ORAL
Status: COMPLETED | OUTPATIENT
Start: 2018-03-04 | End: 2018-03-04

## 2018-03-04 RX ORDER — IBUPROFEN 200 MG
1 TABLET ORAL DAILY
Status: DISCONTINUED | OUTPATIENT
Start: 2018-03-04 | End: 2018-03-04

## 2018-03-04 RX ORDER — LORAZEPAM 2 MG/ML
2 INJECTION INTRAMUSCULAR SEE ADMIN INSTRUCTIONS
Status: DISCONTINUED | OUTPATIENT
Start: 2018-03-04 | End: 2018-03-04 | Stop reason: HOSPADM

## 2018-03-04 RX ORDER — BENZTROPINE MESYLATE 1 MG/ML
1-2 INJECTION INTRAMUSCULAR; INTRAVENOUS
Status: DISCONTINUED | OUTPATIENT
Start: 2018-03-04 | End: 2018-03-06 | Stop reason: HOSPADM

## 2018-03-04 RX ORDER — LORAZEPAM 2 MG/ML
1 INJECTION INTRAMUSCULAR SEE ADMIN INSTRUCTIONS
Status: DISCONTINUED | OUTPATIENT
Start: 2018-03-04 | End: 2018-03-04 | Stop reason: HOSPADM

## 2018-03-04 RX ADMIN — DIPHENHYDRAMINE HYDROCHLORIDE 25 MG: 25 CAPSULE ORAL at 04:35

## 2018-03-04 RX ADMIN — ACETAMINOPHEN 650 MG: 325 TABLET, FILM COATED ORAL at 12:20

## 2018-03-04 RX ADMIN — LORAZEPAM 1 MG: 1 TABLET ORAL at 04:41

## 2018-03-04 RX ADMIN — TRAZODONE HYDROCHLORIDE 50 MG: 50 TABLET ORAL at 21:29

## 2018-03-04 RX ADMIN — HALOPERIDOL 10 MG: 5 TABLET ORAL at 20:03

## 2018-03-04 RX ADMIN — CLONAZEPAM 0.5 MG: 0.5 TABLET ORAL at 20:03

## 2018-03-04 RX ADMIN — IBUPROFEN 600 MG: 600 TABLET ORAL at 16:12

## 2018-03-04 NOTE — IP AVS SNAPSHOT
Mirta Pulse 
 
 
 [facility] Patient: Jason Levine MRN: [id number] :1965 About your hospitalization You were admitted on:  2018 You last received care in the:  [facility] ADULT CHEM DEP You were discharged on:  2018 Why you were hospitalized Your primary diagnosis was:  Not on File Your diagnoses also included:  Depression Follow-up Information Follow up With Details Comments Contact Info Frandy Mejias MD   [address] [facility] [address] [address] 
[phone] [facility]  Pt. Has an appointment for labs scheduled on 3/13/18 @ 2:00 and psychiatrist appointment scheduled on Jeannette Current on 18 @ 10:00 a.m [address] [place] [address],[address] [facility] 
[phone] Discharge Orders None A check jay indicates which time of day the medication should be taken. My Medications START taking these medications Instructions Each Dose to Equal  
 Morning Noon Evening Bedtime  
 clonazePAM 0.5 mg tablet Commonly known as:  Gregary Duos Your last dose was: Your next dose is: Take 1 Tab by mouth nightly. Max Daily Amount: 0.5 mg. Indications: Panic Disorder  
 0.5 mg  
    
   
   
   
  
  
CHANGE how you take these medications Instructions Each Dose to Equal  
 Morning Noon Evening Bedtime  
 haloperidol 10 mg tablet Commonly known as:  HALDOL What changed:  See the new instructions. Your last dose was: Your next dose is: Take 1 Tab by mouth nightly. Indications: Psychotic Disorder 10 mg  
    
   
   
   
  
 * methylphenidate HCl 20 mg tablet Commonly known as:  RITALIN What changed:  Another medication with the same name was added. Make sure you understand how and when to take each. Your last dose was: Your next dose is: take 1 tablet by mouth twice a day for 30 DAYS  
     
   
   
   
  
 * methylphenidate HCl 20 mg tablet Commonly known as:  RITALIN What changed: You were already taking a medication with the same name, and this prescription was added. Make sure you understand how and when to take each. Your last dose was: Your next dose is:    
   
   
 Ritalin 20  Indications: Attention-Deficit Hyperactivity Disorder * Notice: This list has 2 medication(s) that are the same as other medications prescribed for you. Read the directions carefully, and ask your doctor or other care provider to review them with you. STOP taking these medications ABILIFY 9.75 mg/1.3 mL injection Generic drug:  ARIPiprazole ALPRAZolam 2 mg tablet Commonly known as:  Nicanor Queenie AMBIEN 10 mg tablet Generic drug:  zolpidem  
   
  
 traMADol 50 mg tablet Commonly known as:  ULTRAM  
   
  
  
  
Where to Get Your Medications Information on where to get these meds will be given to you by the nurse or doctor. ! Ask your nurse or doctor about these medications  
  clonazePAM 0.5 mg tablet  
 haloperidol 10 mg tablet  
 methylphenidate HCl 20 mg tablet Discharge Instructions BEHAVIORAL HEALTH NURSING DISCHARGE NOTE Emergency Numbers [facility] [facility] Desk: [phone] [facility]: [phone] Suicide Prevention Line: [phone] (TALK) The following personal items collected during your admission are returned to you:  
Dental Appliance: Dental Appliances: None Vision: Visual Aid: None Hearing Aid:   
Jewelry: Jewelry: None Clothing: Clothing: None Other Valuables: Other Valuables: Cell Phone, [place] 1923 (brush, cellphone, wallet, passport in locker. ) Valuables sent to safe: Personal Items Sent to Safe:  (EBT and VISA given to the security. ) The discharge information has been reviewed with the patient.   The patient verbalized understanding. [employer] Activation Thank you for requesting access to [employer]. Please follow the instructions below to securely access and download your online medical record. [employer] allows you to send messages to your doctor, view your test results, renew your prescriptions, schedule appointments, and more. How Do I Sign Up? In your internet browser, go to [email or web address]. Click on the First Time User? Click Here link in the Sign In box. You will be redirect to the New Member Sign Up page. Enter your [employer] Access Code exactly as it appears below. You will not need to use this code after youve completed the sign-up process. If you do not sign up before the expiration date, you must request a new code. [employer] Access Code: [email or web address] Expires: 2018  8:58 PM (This is the date your [employer] access code will ) Enter the last four digits of your Social Security Number (xxxx) and Date of Birth (mm/dd/yyyy) as indicated and click Submit. You will be taken to the next sign-up page. Create a [employer] ID. This will be your [employer] login ID and cannot be changed, so think of one that is secure and easy to remember. Create a [employer] password. You can change your password at any time. Enter your Password Reset Question and Answer. This can be used at a later time if you forget your password. Enter your e-mail address. You will receive e-mail notification when new information is available in [address] Click Sign Up. You can now view and download portions of your medical record. Click the [employer] link to download a portable copy of your medical information. Additional Information If you have questions, please visit the Frequently Asked Questions section of the [employer] website at [email or web address] Remember, [employer] is NOT to be used for urgent needs. For medical emergencies, dial 911. Patient armband removed and shredded Introducing [facility]! [facility] introduces [employer] patient portal. Now you can access parts of your medical record, email your doctor's office, and request medication refills online. 1. In your internet browser, go to [email or web address] 2. Click on the First Time User? Click Here link in the Sign In box. You will see the New Member Sign Up page. 3. Enter your [employer] Access Code exactly as it appears below. You will not need to use this code after youve completed the sign-up process. If you do not sign up before the expiration date, you must request a new code. · [employer] Access Code: [email or web address] Expires: 4/12/2018  8:58 PM 
 
4. Enter the last four digits of your Social Security Number (xxxx) and Date of Birth (mm/dd/yyyy) as indicated and click Submit. You will be taken to the next sign-up page. 5. Create a [employer] ID. This will be your [employer] login ID and cannot be changed, so think of one that is secure and easy to remember. 6. Create a [employer] password. You can change your password at any time. 7. Enter your Password Reset Question and Answer. This can be used at a later time if you forget your password. 8. Enter your e-mail address. You will receive e-mail notification when new information is available in [address] 9. Click Sign Up. You can now view and download portions of your medical record. 10. Click the Download Summary menu link to download a portable copy of your medical information. If you have questions, please visit the Frequently Asked Questions section of the [employer] website. Remember, [employer] is NOT to be used for urgent needs. For medical emergencies, dial 911. Now available from your iPhone and Android! Providers Seen During Your Hospitalization Provider Specialty Primary office phone Shailesh Turk MD Psychiatry [phone] Your Primary Care Physician (PCP) Primary Care Physician Office Phone Office Fax [address], [address] [phone] You are allergic to the following Allergen Reactions Adderall (Dextroamphetamine-Amphetamine) Anaphylaxis \"makes me have a heart attack\" Chicken Derived Other (comments) Erythromycin Angioedema Fish Derived Other (comments)  
 swelling Recent Documentation Breastfeeding? OB Status Smoking Status No Having regular periods Current Every Day Smoker Emergency Contacts Name Discharge Info Relation Home Work Mobile Matthew Dunlap DISCHARGE CAREGIVER [3] Spouse [3] [phone] GitaBraulio DISCHARGE CAREGIVER [3] Friend [5] [phone] Patient Belongings The following personal items are in your possession at time of discharge: 
  Dental Appliances: None  Visual Aid: None      Home Medications:  (2 bottles given to the nurse.)   Jewelry: None  Clothing: None    Other Valuables: Cell Phone, Wallet (brush, cellphone, wallet, passport in locker. )  Personal Items Sent to Safe:  (EBT and VISA given to the security. ) Please provide this summary of care documentation to your next provider. Signatures-by signing, you are acknowledging that this After Visit Summary has been reviewed with you and you have received a copy. Patient Signature:  ____________________________________________________________ Date:  ____________________________________________________________  
  
Jayla Shah Provider Signature:  ____________________________________________________________ Date:  ____________________________________________________________

## 2018-03-04 NOTE — ED NOTES
6: 62 AM :Pt care assumed from Dr. Mau Ward, ED provider. Pt complaint(s), current treatment plan, progression and available diagnostic results have been discussed thoroughly. Rounding occurred: yes  Intended Disposition: Transfer   Pending psychiatric placement. 11:00 AM The patient has been accepted  To [facility] by Dr. Terrance Callejas    12:10 PM [facility] present to transport patient to [facility]. Pt appropriate for transfer at this time. Disposition: Transferred  Diagnosis:   1. Schizoaffective disorder, unspecified type ([facility]    2. Sedative addiction ([facility]      Kevin Mata MD  03/04/2018    SCRIBE ATTESTATION STATEMENT  Documented by: John Tarango scribing for, and in the presence of, Kevin Mata MD 6:58 AM     Signed by: Mauro Short, 03/04/18 6:58 AM     PROVIDER ATTESTATION STATEMENT  I personally performed the services described in the documentation, reviewed the documentation, as recorded by the scribe in my presence, and it accurately and completely records my words and actions.   Kevin Mata MD

## 2018-03-04 NOTE — PROGRESS NOTES
Seeking voluntary inpatient psychiatric bed for patient. Paged [address] on-call. Waiting call back.

## 2018-03-04 NOTE — ED NOTES
1900: I assumed care of this patient from Dr. Eliane Lugo. Patient presented with benzodiazepine abuse and suicidal ideation currently voluntary and awaiting placement, to be made involuntary if she attempts to leave. It is now the end of my shift, I am still awaiting placement. Patient will be signed out to the oncoming physician Dr. Radha Borrero at 0700. Disposition:    Pending      Portions of this chart were created with Dragon medical speech to text program.   Unrecognized errors may be present.

## 2018-03-04 NOTE — ED NOTES
Assume care of patient, patient awake in bed, calm at this time, patient waiting for placement, patient in paper scrubs, sitter at bedside.   Purposeful rounding completed:    Side rails up x 2:  YES  Bed in low position and wheels locked: YES  Call bell within reach: YES  Comfort addressed: YES    Toileting needs addressed: YES  Plan of care reviewed/updated with patient and or family members: YES  IV site assessed: NO  Pain assessed and addressed: YES, 0

## 2018-03-04 NOTE — BH NOTES
Maria E Rawls is participating in Music Therapy. Group time: 0320    Personal goal for participation:  Relax while listening to Aromatherapy music    Goal orientation: relaxation    Group therapy participation: active    Therapeutic interventions reviewed and discussed: Staff encouraged Pt.  To participate in listening to soft music    Impression of participation:  Pt. was calm relax and coloring zing patterns while listening to [address]

## 2018-03-04 NOTE — PROGRESS NOTES
Patient admitted at this time from [facility] er. Patient states she is stress but denies suicidal ideations. She was upset due to her clothing did not come with her from the er. She denies hearing voices. States she is healthy , no medical problems. She states she normally take 4 tablets a day of xanax but took 5 instead in the attempt to get some rest. Patient arrived with a sealed bag from [address] er  with 2 bottles of medication label haldol and alprazolam .Patient speech is slurred and slowed . oriented to unit and guidelines.

## 2018-03-04 NOTE — ED NOTES
[facility] transport here for patient. Provided copy of EMTALA, gave patient's belongings and medications.

## 2018-03-04 NOTE — PROGRESS NOTES
Spoke with Teresa José at [facility]. They will be able to accept after 12:00 today. Accepting physician is Neida Martell. Patient will be going to room 125 Bed 1. Number for report is [phone].

## 2018-03-04 NOTE — ED NOTES
Patient up ambulating about unit with complaint of wanting something for sleep. Informed patient that she has an order for Benadryl. Patient states that she doesn't want the benadryl and she wants Klonopin. Dr. Allie Genao notified and he spoke with the patient. Patient then stating that she is having nausea and vomited. Noted water in a cup that patient stated she vomited.

## 2018-03-04 NOTE — ED NOTES
TRANSFER - OUT REPORT:    Verbal report given to Carolyn Duffy RN(name) on [facility]  being transferred to [facility]) for routine progression of care       Report consisted of patients Situation, Background, Assessment and   Recommendations(SBAR). Information from the following report(s) SBAR, Kardex, ED Summary and MAR was reviewed with the receiving nurse. Lines:       Opportunity for questions and clarification was provided.       Patient transported with: BLS transport

## 2018-03-05 PROBLEM — F32.A DEPRESSION: Status: ACTIVE | Noted: 2018-03-05

## 2018-03-05 PROCEDURE — 65220000003 HC RM SEMIPRIVATE PSYCH

## 2018-03-05 PROCEDURE — 74011250637 HC RX REV CODE- 250/637: Performed by: PSYCHIATRY & NEUROLOGY

## 2018-03-05 PROCEDURE — 74011250637 HC RX REV CODE- 250/637: Performed by: STUDENT IN AN ORGANIZED HEALTH CARE EDUCATION/TRAINING PROGRAM

## 2018-03-05 RX ORDER — GUAIFENESIN 100 MG/5ML
200 SOLUTION ORAL
Status: DISCONTINUED | OUTPATIENT
Start: 2018-03-05 | End: 2018-03-06 | Stop reason: HOSPADM

## 2018-03-05 RX ORDER — PROMETHAZINE HYDROCHLORIDE 25 MG/1
12.5 TABLET ORAL
Status: DISCONTINUED | OUTPATIENT
Start: 2018-03-05 | End: 2018-03-06 | Stop reason: HOSPADM

## 2018-03-05 RX ORDER — METHYLPHENIDATE HYDROCHLORIDE 10 MG/1
10 TABLET ORAL 2 TIMES DAILY
Status: DISCONTINUED | OUTPATIENT
Start: 2018-03-05 | End: 2018-03-06 | Stop reason: HOSPADM

## 2018-03-05 RX ADMIN — METHYLPHENIDATE HYDROCHLORIDE 10 MG: 10 TABLET ORAL at 13:01

## 2018-03-05 RX ADMIN — HALOPERIDOL 10 MG: 5 TABLET ORAL at 20:11

## 2018-03-05 RX ADMIN — CLONAZEPAM 0.5 MG: 0.5 TABLET ORAL at 20:11

## 2018-03-05 RX ADMIN — PROMETHAZINE HYDROCHLORIDE 12.5 MG: 25 TABLET ORAL at 11:23

## 2018-03-05 RX ADMIN — IBUPROFEN 600 MG: 600 TABLET ORAL at 19:19

## 2018-03-05 RX ADMIN — HALOPERIDOL 5 MG: 5 TABLET ORAL at 13:13

## 2018-03-05 RX ADMIN — TRAZODONE HYDROCHLORIDE 50 MG: 50 TABLET ORAL at 21:10

## 2018-03-05 RX ADMIN — IBUPROFEN 600 MG: 600 TABLET ORAL at 14:12

## 2018-03-05 RX ADMIN — GUAIFENESIN 200 MG: 200 SOLUTION ORAL at 08:41

## 2018-03-05 RX ADMIN — CLONAZEPAM 0.5 MG: 0.5 TABLET ORAL at 07:55

## 2018-03-05 NOTE — PROGRESS NOTES
Problem: Suicide/Homicide (Adult/Pediatric)  Goal: *STG: Remains safe in hospital  Patient will be assessed daily for safety. Outcome: Progressing Towards Goal  Pt remains safe. Goal: *STG/LTG: Complies with medication therapy  Patient will take prescribed medication daily. Outcome: Progressing Towards Goal  Pt takes medications as ordered. Problem: Falls - Risk of  Goal: *Absence of Falls  Document Compa Fall Risk and appropriate interventions in the flowsheet. Outcome: Progressing Towards Goal  Fall Risk Interventions:  Medication Interventions: Teach patient to arise slowly  Pt remains free of falls. Comments: Patient has been back and forth to the desk repeatedly this morning for various concerns. Of note, patient states that she vomited after breakfast and was medicated with Phenergan per order. Patient appears lethargic and seems unable to focus or keep her mind in any conversation. Patient appears uncertain of what she needs to do next and needs guidance. Patient finally went to lay down after being told that she looked tired and maybe she should take a nap. Patient denies SI/HI and AVH but is unable to focus long enough to tell me why she is here.

## 2018-03-05 NOTE — BH NOTES
Patient came to the nurse station asking for sleep medication, patient wanted her medication clonazepam I informed the patient that her medication clonazepam is scheduled and it is not due at this time, and to inform her physician that she is unable to sleep at night will continue to monitor.

## 2018-03-05 NOTE — BSMART NOTE
SOCIAL WORK GROUP THERAPY PROGRESS NOTE      Time: 2:00     Group Topic: Coping with Triggers and Ways to resolve Conflict    Group Participation: Pt. participated in todays group. Pt. provided  examples of her triggers. SW provided pt. With  positive feedback.

## 2018-03-05 NOTE — H&P
Psychiatric Intake Note    Date: 18   Patient Name: Alexandrea Meza  : 1965  MRN: [id number]  Hospital Day: 2    CC: \"I was bored. \"    HISTORY OF PRESENT ILLNESS:   Patient is a 48 yo female unclear psych hx presents to hospital for SI. This morning, she is very slowed down, acting bizarre, laughing inappropriately at times, taking long time to respond to questions and very concrete in thought process. She says that she was \"bored\" and so, called 911. She cannot relay details of what brought her to the hospital. Denies acute SI now. Denies hallucinations, but thought blocking present. She says that she \"just wanted attention\" from her boyfriend and says she was triggered when boyfriend didn't give her attention. Also, admits to not feeling well physically today, endorses vomiting her breakfast and feeling nauseous and sick to her stomach, mild abd cramps.       PSYCHIATRIC HISTORY:  OUTPATIENT PROVIDERS: denies  HOSPITALIZATIONS: endorses  SUICIDE ATTEMPTS: denies  CURRENT MEDICATIONS: ritalin, Xanax    PAST MEDICAL/ SURGICAL HISTORY:  None    ALLERGIES: adderall    FAMILY HISTORY OF MENTAL ILLNESS:   Mother side: unknown  Father's side: unknown  Siblings: unknown    SOCIAL HISTORY:  Current Living Situation: lives with   Relationship status: single  Children: has 31 yo, several grandchildren  Employment: not employed  Education: 4 years college, cosmetology school  Legal:  denies  Abuse History: denies    SUBSTANCE USE:  Regular crack cocaine use  Abuses Xanax and other benzodiazepines    REVIEW OF SYSTEMS: reviewed 10 organ systems- negative, except as noted in HPI    MENTAL STATUS EXAM:  Appearance: Dressed in hospital gown with poor grooming and hygiene  Behavior: Cooperative with good eye contact  Motor: +psychomotor retardation  Speech: slowed  Mood: \"okay\"  Affect: flat, bizarre  Thought Process: bizarre, disorganized, thought blocking  Thought Content: Denies SI and HI  Perception: Denies AH or VH  Concentration: poor  Memory: fair  Cognition: Alert and oriented  Insight: fair  Judgment: fair    RISK ASSESSMENT:   Prior Attempts: no noted prior  Lethality of Attempts: none noted prior  Current Ideation/Plan: no  Weapons at [employer]  Alcohol/Drug Use: yes  Protective Factors: limited, no supportive family or peer support  Future Orientation: no    ASSESSMENT: Patient is a 48 yo female unclear psych hx presents to hospital for SI. Patient presents very bizarrely this morning, unclear whether this could be due in part from substance use or underlying intellectual disabilities or pure psychotic disorder. Diagnoses:  Unspecified psychotic disorder  ADHD, by hx  Cocaine use disorder  Benzodiazepine abuse    Plan:  1. Continue with inpatient psychiatric treatment for safety, stabilization, and medication management  2. Continue with suicide or assault precautions  3. Patient is to continue with Art/OT and family therapy sessions  4. Will need to talk with outpatient providers for more collateral  5. Will need to talk with family/ friends for more collateral  6. Medications:  Ritalin 10 mg po daily  Klonopin 0.5 mg po bid  Haldol 10 mg po nightly  7. Labs: reviewed, UDS +benzos, +kelly  8. SW to help with disposition  9. ELOS 5-7 days      Stefani L. Darryle Chaco, MD

## 2018-03-05 NOTE — BSMART NOTE
Pt. Is a 47year old female with history of Bipolar disorder and Cocaine Abuse. pt was admitted to this facility for an overdose. SW Contact:  SW met with pt. To discuss the reason for this admission. Pt expressed she wanted attention from her boyfriend. Pt stated she was thinking he was going to leave her. Pt stated she was not feeling safe and called 911. Pt. Expressed she thought calling 911 was stupid because, now the boyfriend is more attentive to her now. SW and pt. processed how she could have handled the situation differently. SW discussed positive coping skills, reacting / reaction, positive contact resolution. Pt. appeared bizarre, tangential and disorganized. SW will contact the  family for a collateral. Pt plans  to return to her home with boyfriend upon d/c.

## 2018-03-05 NOTE — BH NOTES
GROUP THERAPY PROGRESS NOTE    Dinora Dunlap is participating in [place].      Group time: 30 minutes    Personal goal for participation: talk w/family     Goal orientation: community    Group therapy participation: active    Therapeutic interventions reviewed and discussed: rules and guideline and personal goals

## 2018-03-05 NOTE — BSMART NOTE
OCCUPATIONAL THERAPY PROGRESS NOTE  Group Time:  3061  Attendance: The patient attended full group. Participation:  The patient participated with minimal elaboration in the activity. Attention:  The patient needed frequent redirection to activity. Interaction:  The patient acknowledges others or responds to questions,  with no spontaneous interaction. Unable to focus on activity without specific help and then just briefly. Interactions hard to follow and patient seems confused and disorganized. States she takes Xanax 4 times a day and took an extra one and called the police, denies this was a suicide attempt, unclear if she was having thoughts of harming self.

## 2018-03-05 NOTE — BH NOTES
Patient came out in the day area stating her scrub pants were falling down, I informed the patient that gowns were available on the counter the patient then took off her scrub pants while in the day area wearing only a shirt. Patient was redirected to put gown on and not to undress in her room the patient began to laugh out loud will continue to monitor.

## 2018-03-05 NOTE — H&P
History and Physical        Patient: Brandee Mckeon               Sex: female          DOA: 3/4/2018         YOB: 1965      Age:  47 y.o.        LOS:  LOS: 1 day        HPI:     Brandee Mckeon is a 47 y.o. female who was admitted experiencing depression and suicidal ideation. Active Problems:    Depression (3/5/2018)        Past Medical History:   Diagnosis Date    Depression     Psychotic disorder        Past Surgical History:   Procedure Laterality Date    BREAST SURGERY PROCEDURE UNLISTED Bilateral 2000    BREAST AUGMENTATION       Family History   Problem Relation Age of Onset    Heart Disease Mother     Cancer Mother     Prostate Cancer Father     Cancer Father        Social History     Social History    Marital status: SINGLE     Spouse name: N/A    Number of children: 1    Years of education:      Social History Main Topics    Smoking status: Current Every Day Smoker     Packs/day: 2.00     Types: Cigarettes    Smokeless tobacco: Never Used    Alcohol use No    Drug use: No    Sexual activity: Not on file     Other Topics Concern    Not on file     Social History Narrative   Lives her boyfriend. Appetite and sleep are okay. She is unemployed. Prior to Admission medications    Medication Sig Start Date End Date Taking? Authorizing Provider   zolpidem (AMBIEN) 10 mg tablet Take 5 mg by mouth nightly as needed for Sleep. Lenore Ospina MD   traMADol (ULTRAM) 50 mg tablet Take 1 Tab by mouth every six (6) hours as needed for Pain. Max Daily Amount: 200 mg. 1/12/18   Tara Keane MD   ALPRAZolam Viona Kuhn) 2 mg tablet  4/13/17   Historical Provider   haloperidol (HALDOL) 10 mg tablet take 2 tablets by mouth at bedtime 4/2/17   Historical Provider   methylphenidate (RITALIN) 20 mg tablet take 1 tablet by mouth twice a day for 30 DAYS 3/16/17   Historical Provider   ARIPiprazole (ABILIFY) 9.75 mg/1.3 mL injection by IntraMUSCular route daily.     Historical Provider Allergies   Allergen Reactions    Adderall [Dextroamphetamine-Amphetamine] Anaphylaxis     \"makes me have a heart attack\"    Chicken Derived Other (comments)    Erythromycin Angioedema    Fish Derived Other (comments)     swelling       Review of Systems  A comprehensive review of systems was negative. Physical Exam:      Visit Vitals    Breastfeeding No       Physical Exam:    General:  Alert, cooperative, well developed, well nourished, obese  female, no distress, appears stated age. Also appeared to be confused at times during interview. Eyes:  Conjunctivae/corneas clear. PERRL, EOMs intact. Fundi benign   Ears:  Normal TMs and external ear canals both ears. Nose: Nares normal. Septum midline. Mucosa normal. No drainage or sinus tenderness. Mouth/Throat: Lips, mucosa, and tongue normal. Teeth and gums normal.   Neck: Supple, symmetrical, trachea midline, no adenopathy, thyroid: no enlargement/tenderness/nodules, no carotid bruit and no JVD. Back:   Symmetric, no curvature. ROM normal. No CVA tenderness. Lungs:   Clear to auscultation bilaterally. Heart:  Regular rate and rhythm, S1, S2 normal, no murmur, click, rub or gallop. Abdomen:   Soft, non-tender. Bowel sounds normal. No masses,  No organomegaly. Extremities: Extremities normal, atraumatic, no cyanosis or edema. Pulses: 2+ and symmetric all extremities. Skin: Skin color, texture, turgor normal. No rashes or lesions   Lymph nodes: Cervical, supraclavicular, and axillary nodes normal.   Neurologic: CNII-XII intact. Normal strength, sensation and reflexes throughout.            Assessment/Plan     Depression  Suicidal ideation  Labs reviewed (+Cocaine, Amphetamines)  Treatment per physician's orders

## 2018-03-05 NOTE — BH NOTES
Crystal Veras is  participating in [place]. Group time: 2170     Personal goal for participation: Community announcement    Goal orientation: community    Group therapy participation: fully participated    Therapeutic interventions reviewed and discussed: Staff discussed  Staff discussed the Mental Health programs offered. Unit schedule for groups,  Visiting hours, patient advocate name and phone number and where this information is posted on the unit, etc. Report any maintenance/housekeeping or treatment concerns to staff so it can be addressed by the Treatment Team.    Impression of participation: Pt.did not have any maintenance/housekeeping or treatment concerns to report to staff .

## 2018-03-06 VITALS
SYSTOLIC BLOOD PRESSURE: 136 MMHG | RESPIRATION RATE: 18 BRPM | HEART RATE: 104 BPM | DIASTOLIC BLOOD PRESSURE: 87 MMHG | TEMPERATURE: 97.8 F

## 2018-03-06 PROCEDURE — 74011250637 HC RX REV CODE- 250/637: Performed by: STUDENT IN AN ORGANIZED HEALTH CARE EDUCATION/TRAINING PROGRAM

## 2018-03-06 PROCEDURE — 74011250637 HC RX REV CODE- 250/637: Performed by: PSYCHIATRY & NEUROLOGY

## 2018-03-06 RX ORDER — METHYLPHENIDATE HYDROCHLORIDE 20 MG/1
TABLET ORAL
Qty: 14 TAB | Refills: 0 | Status: SHIPPED | OUTPATIENT
Start: 2018-03-06 | End: 2020-01-01

## 2018-03-06 RX ORDER — CLONAZEPAM 0.5 MG/1
0.5 TABLET ORAL
Qty: 7 TAB | Refills: 0 | Status: SHIPPED | OUTPATIENT
Start: 2018-03-06 | End: 2020-01-01

## 2018-03-06 RX ORDER — HALOPERIDOL 10 MG/1
10 TABLET ORAL
Qty: 14 TAB | Refills: 0 | Status: SHIPPED | OUTPATIENT
Start: 2018-03-06 | End: 2020-01-01

## 2018-03-06 RX ADMIN — METHYLPHENIDATE HYDROCHLORIDE 10 MG: 10 TABLET ORAL at 08:18

## 2018-03-06 RX ADMIN — CLONAZEPAM 0.5 MG: 0.5 TABLET ORAL at 08:18

## 2018-03-06 NOTE — DISCHARGE INSTRUCTIONS
BEHAVIORAL HEALTH NURSING DISCHARGE NOTE      Emergency Numbers    : [facility] Emergency Services: [phone]  Suicide Prevention Line: [phone] (TALK)      The following personal items collected during your admission are returned to you:   Dental Appliance: Dental Appliances: None  Vision: Visual Aid: None  Hearing Aid:    Jewelry: Jewelry: None  Clothing: Clothing: None  Other Valuables: Other Valuables: Cell Phone, [place]nam[place] (brush, cellphone, wallet, passport in locker. )  Valuables sent to safe: Personal Items Sent to Safe:  (EBT and VISA given to the security. )        The discharge information has been reviewed with the patient. The patient verbalized understanding. [employer] Activation    Thank you for requesting access to [employer]. Please follow the instructions below to securely access and download your online medical record. [employer] allows you to send messages to your doctor, view your test results, renew your prescriptions, schedule appointments, and more. How Do I Sign Up? In your internet browser, go to [email or web address])  Click on the First Time User? Click Here link in the Sign In box. You will be redirect to the New Member Sign Up page. Enter your [employer] Access Code exactly as it appears below. You will not need to use this code after youve completed the sign-up process. If you do not sign up before the expiration date, you must request a new code. [employer] Access Code: [email or web address]  Expires: 2018  8:58 PM (This is the date your [employer] access code will )    Enter the last four digits of your Social Security Number (xxxx) and Date of Birth (mm/dd/yyyy) as indicated and click Submit. You will be taken to the next sign-up page. Create a [employer] ID. This will be your [employer] login ID and cannot be changed, so think of one that is secure and easy to remember. Create a [employer] password. You can change your password at any time.   Enter your Password Reset Question and Answer. This can be used at a later time if you forget your password. Enter your e-mail address. You will receive e-mail notification when new information is available in [address] Click Sign Up. You can now view and download portions of your medical record. Click the GreenButton link to download a portable copy of your medical information. Additional Information    If you have questions, please visit the Frequently Asked Questions section of the [employer] website at [email or web address] Remember, [employer] is NOT to be used for urgent needs. For medical emergencies, dial 911.     Patient armband removed and shredded

## 2018-03-06 NOTE — BH NOTES
GROUP THERAPY PROGRESS NOTE    Ryley Dunlap is participating in [employer]. Group time: 20 minutes    Personal goal for participation: Discussed issues/unit guidelines    Goal orientation: personal    Group therapy participation: passive    Therapeutic interventions reviewed and discussed:     Impression of participation: Pt sat quietly in group only asking about her clothes that were   possibly left  At the other hospital she came from.

## 2018-03-06 NOTE — BSMART NOTE
Pt. Is a 47year old female with history of Bipolar disorder and Cocaine Abuse. pt was admitted to this facility for an overdose. MUKESH discussed case with the unit staff and psychiatrist    MUKESH Contact:  SW met with pt to discuss d/c .  SW discussed safety plan and continued treatment compliance. The pt. is denying ideations and hallucinations. Pt plans to return to her home with boyfriend upon d/c. The pt. has appointments [facility]. She is  scheduled for labs on 3/13/18 @ 2:00 and psychiatrist appointment on Sai Rojas on 4/17/18 @ 10:00 a.m.

## 2018-03-06 NOTE — BH NOTES
Patient is being discharged off the unit with her belongings, discharge paperwork and prescriptions. Patient was being picked up by her boyfriend.

## 2018-03-06 NOTE — BH NOTES
Pt  appears confused had to be redirected about body space, pt easily redirected. Pt. denies SI/HI. AV/H. Pt contracts for safety on the unit. Pt.denies any new medical/pain issues. Pt. did not have any visitors. Staff encouraged Pt. to communicate concerns to the Treatment Team to ensure accurate treatment is addressed. Pt agreed. Pt. remain free of falls and provided non skid socks. Staff will continue to monitor Pt. for behavior safety and location.

## 2018-03-06 NOTE — DISCHARGE SUMMARY
Psychiatric Discharge Summary    Date: 18   Patient Name: Maria E Rawls  : 1965  MRN: [id number]  Admission Date: 3/4/2018  Discharge Date: 3/6/2018    HISTORY OF PRESENT ILLNESS:   Patient is a 48 yo female unclear psych hx presents to hospital for SI. This morning, she is very slowed down, acting bizarre, laughing inappropriately at times, taking long time to respond to questions and very concrete in thought process. She says that she was \"bored\" and so, called 911. She cannot relay details of what brought her to the hospital. Denies acute SI now. Denies hallucinations, but thought blocking present. She says that she \"just wanted attention\" from her boyfriend and says she was triggered when boyfriend didn't give her attention.     Also, admits to not feeling well physically today, endorses vomiting her breakfast and feeling nauseous and sick to her stomach, mild abd cramps. HOSPITAL COURSE:   Once on the unit, patient was somewhat bizarre with little awareness to surroundings. From history and current presentation, it was presumed that substance use--cocaine with abuse of benzodiazepines-- explained majority of patient's symptoms. She was not acutely psychotic, but did display odd social interactions with others. She was restarted on home meds of ritalin and haldol. She was given klonopin in place of Xanax, tolerated meds well without issue. On day of discharge, patient was clinically Stable, calm, cooperative, not suicidal, not homicidal, not psychotic. She was still bizarre and intrusive at times, but no hallucinations or overt delusions.  She is requesting to leave hospital.    MENTAL STATUS EXAM:  Orientation: oriented to person, place, time, and situation  Appearance: Dressed in hospital gown with fair grooming and hygiene  Behavior: Cooperative with good eye contact  Motor: No psychomotor agitation/retardation  Speech: Normal rate, tone and volume  Mood: \"I'm fine\"  Affect: bizarre  Thought Process: linear, goal-directed  Thought Content: Denies SI and HI  Perception: Denies AH or VH  Concentration: fair  Memory: fair  Cognition: Alert and oriented  Insight: fair  Judgment: fair    ASSESSMENT at time of discharge:   Stable, calm, cooperative, not suicidal, not homicidal. Pt still acting odd at times, but not psychotic. Low risk for imminent harm to self or others    Diagnoses:  ADHD  Cocaine use disorder, severe  Benzodiazepine use disorder, severe      Discharge Instructions:  1. Continue psychiatric medications of   Ritalin 20 mg po daily  Klonopin 0.5 mg po nightly  Haldol 10 mg po nightly  2. Please make all follow up appointments with doctors and , as provided by inpatient behavioral health . 3. If you feel unsafe or begin experiencing suicidal thoughts again, please call 9-1-1 or return to the nearest emergency department. Disposition:  Home with outpatient follow-up      Isabela Johnson MD

## 2018-05-29 ENCOUNTER — HOSPITAL ENCOUNTER (OUTPATIENT)
Dept: ULTRASOUND IMAGING | Age: 53
Discharge: HOME | End: 2018-05-29
Payer: MEDICAID

## 2018-05-29 DIAGNOSIS — R74.01 TRANSAMINITIS: ICD-10-CM

## 2018-05-29 DIAGNOSIS — R10.11 RUQ ABDOMINAL PAIN: ICD-10-CM

## 2018-05-29 PROCEDURE — 76705 ECHO EXAM OF ABDOMEN: CPT

## 2018-08-20 ENCOUNTER — OFFICE VISIT (OUTPATIENT)
Dept: INTERNAL MEDICINE CLINIC | Age: 53
End: 2018-08-20

## 2018-08-20 ENCOUNTER — HOSPITAL ENCOUNTER (OUTPATIENT)
Dept: LAB | Age: 53
Discharge: HOME | End: 2018-08-20
Payer: MEDICAID

## 2018-08-20 VITALS
OXYGEN SATURATION: 95 % | HEIGHT: 67 IN | TEMPERATURE: 97.7 F | SYSTOLIC BLOOD PRESSURE: 133 MMHG | RESPIRATION RATE: 22 BRPM | HEART RATE: 104 BPM | WEIGHT: 270.2 LBS | BODY MASS INDEX: 42.41 KG/M2 | DIASTOLIC BLOOD PRESSURE: 86 MMHG

## 2018-08-20 DIAGNOSIS — Z83.3 FH: DIABETES MELLITUS: ICD-10-CM

## 2018-08-20 DIAGNOSIS — Z11.1 SCREENING-PULMONARY TB: ICD-10-CM

## 2018-08-20 DIAGNOSIS — Z13.220 SCREENING, LIPID: ICD-10-CM

## 2018-08-20 DIAGNOSIS — F20.9 SCHIZOPHRENIA, UNSPECIFIED TYPE (HCC): ICD-10-CM

## 2018-08-20 DIAGNOSIS — N39.46 MIXED STRESS AND URGE URINARY INCONTINENCE: Primary | ICD-10-CM

## 2018-08-20 PROBLEM — E66.01 OBESITY, MORBID (HCC): Status: ACTIVE | Noted: 2018-08-20

## 2018-08-20 LAB
APPEARANCE UR: CLEAR
BACTERIA URNS QL MICRO: NEGATIVE /HPF
BILIRUB UR QL: NEGATIVE
COLOR UR: ABNORMAL
EPITH CASTS URNS QL MICRO: NORMAL /LPF (ref 0–5)
GLUCOSE UR STRIP.AUTO-MCNC: 250 MG/DL
HGB UR QL STRIP: NEGATIVE
KETONES UR QL STRIP.AUTO: NEGATIVE MG/DL
LEUKOCYTE ESTERASE UR QL STRIP.AUTO: NEGATIVE
MM INDURATION POC: NORMAL MM (ref 0–5)
NITRITE UR QL STRIP.AUTO: NEGATIVE
PH UR STRIP: 5 [PH] (ref 5–8)
PPD POC: NEGATIVE NEGATIVE
PROT UR STRIP-MCNC: ABNORMAL MG/DL
RBC #/AREA URNS HPF: 0 /HPF (ref 0–5)
SP GR UR REFRACTOMETRY: >1.03 (ref 1–1.03)
UROBILINOGEN UR QL STRIP.AUTO: 0.2 EU/DL (ref 0.2–1)
WBC URNS QL MICRO: NORMAL /HPF (ref 0–4)

## 2018-08-20 PROCEDURE — 81001 URINALYSIS AUTO W/SCOPE: CPT | Performed by: INTERNAL MEDICINE

## 2018-08-20 RX ORDER — ZOLPIDEM TARTRATE 10 MG/1
20 TABLET ORAL
Status: CANCELLED | OUTPATIENT
Start: 2018-08-20

## 2018-08-20 RX ORDER — ZOLPIDEM TARTRATE 10 MG/1
20 TABLET ORAL
COMMUNITY
End: 2020-01-01

## 2018-08-20 RX ORDER — TOLTERODINE 2 MG/1
2 CAPSULE, EXTENDED RELEASE ORAL DAILY
Qty: 30 CAP | Refills: 2 | Status: SHIPPED | OUTPATIENT
Start: 2018-08-20 | End: 2018-11-19 | Stop reason: SDUPTHER

## 2018-08-20 NOTE — MR AVS SNAPSHOT
[address] [place] [address] 
 
 
 [address] [facility] [address] [address] 
[phone] Patient: Lilia Manzo MRN: [id number] :1965 Visit Information Date & Time Provider Department Dept. Phone Encounter #  
 2018  2:00 PM [address] [phone] [id number] Follow-up Instructions Return if symptoms worsen or fail to improve. Upcoming Health Maintenance Date Due Pneumococcal 19-64 Medium Risk (1 of 1 - PPSV23) 1985 DTaP/Tdap/Td series (1 - Tdap) 1987 PAP AKA CERVICAL CYTOLOGY 1987 BREAST CANCER SCRN MAMMOGRAM 2016 FOBT Q 1 YEAR AGE 50-75 2016 Influenza Age 5 to Adult 2018 Allergies as of 2018  Review Complete On: 2018 By: Koko Arreola Severity Noted Reaction Type Reactions Adderall [Dextroamphetamine-amphetamine] High 2018    Anaphylaxis \"makes me have a heart attack\" Chicken Derived  2018    Other (comments) Erythromycin  2017    Angioedema Fish Derived  2018    Other (comments)  
 swelling Current Immunizations  Never Reviewed Name Date  
 TB Skin Test (PPD) Intradermal  Incomplete Not reviewed this visit You Were Diagnosed With   
  
 Codes Comments Mixed stress and urge urinary incontinence    -  Primary ICD-10-CM: N39.46 
ICD-9-CM: 788.33 BMI 40.0-44.9, adult [facility])     ICD-10-CM: Z68.41 
ICD-9-CM: V85.41 Sch[facility]     ICD-10-CM: F20.9 ICD-9-CM: 295.90 FH: diabetes mellitus     ICD-10-CM: Z83.3 ICD-9-CM: V18.0 Screening, lipid     ICD-10-CM: Y85.289 ICD-9-CM: V77.91 Screening-pulmonary TB     ICD-10-CM: Z11.1 ICD-9-CM: V74.1 Vitals BP Pulse Temp Resp Height(growth percentile) Weight(growth percentile)  133/86 (BP 1 Location: Right arm, BP Patient Position: Sitting) (!) 104 97.7 °F (36.5 °C) (Oral) 22 5' 7\" (1.702 m) 270 lb 3.2 oz (122.6 kg) SpO2 BMI OB Status Smoking Status 95% 42.32 kg/m2 Having regular periods Current Every Day Smoker BMI and BSA Data Body Mass Index Body Surface Area  
 42.32 kg/m 2 2.41 m 2 Preferred Pharmacy Pharmacy Name Phone [id number] [facility] [phone] Your Updated Medication List  
  
   
This list is accurate as of 8/20/18  2:06 PM.  Always use your most recent med list.  
  
  
  
  
 AMBIEN 10 mg tablet Generic drug:  zolpidem Take 20 mg by mouth nightly as needed for Sleep.  
  
 clonazePAM 0.5 mg tablet Commonly known as:  Sheila Blaze Take 1 Tab by mouth nightly. Max Daily Amount: 0.5 mg. Indications: Panic Disorder  
  
 haloperidol 10 mg tablet Commonly known as:  HALDOL Take 1 Tab by mouth nightly. Indications: Psychotic Disorder * methylphenidate HCl 20 mg tablet Commonly known as:  RITALIN  
take 1 tablet by mouth twice a day for 30 DAYS  
  
 * methylphenidate HCl 20 mg tablet Commonly known as:  RITALIN Ritalin 20  Indications: Attention-Deficit Hyperactivity Disorder  
  
 tolterodine ER 2 mg ER capsule Commonly known as:  DETROL-LA Take 1 Cap by mouth daily. * Notice: This list has 2 medication(s) that are the same as other medications prescribed for you. Read the directions carefully, and ask your doctor or other care provider to review them with you. Prescriptions Sent to Pharmacy Refills  
 tolterodine ER (DETROL-LA) 2 mg ER capsule 2 Sig: Take 1 Cap by mouth daily. Class: Normal  
 [facility]acy: [facility] [address] [place] Ph #: [phone] Route: Oral  
  
We Performed the Following AMB POC TUBERCULOSIS, INTRADERMAL (SKIN TEST) [40433 CPT(R)] Follow-up Instructions Return if symptoms worsen or fail to improve. Patient Instructions Bladder Training: Care Instructions Your Care Instructions Bladder training is used to treat urge incontinence and stress incontinence. Urge incontinence means that the need to urinate comes on so fast that you can't get to a toilet in time. Stress incontinence means that you leak urine because of pressure on your bladder. For example, it may happen when you laugh, cough, or lift something heavy. Bladder training can increase how long you can wait before you have to urinate. It can also help your bladder hold more urine. And it can give you better control over the urge to urinate. It is important to remember that bladder training takes a few weeks to a few months to make a difference. You may not see results right away, but don't give up. Follow-up care is a key part of your treatment and safety. Be sure to make and go to all appointments, and call your doctor if you are having problems. It's also a good idea to know your test results and keep a list of the medicines you take. How can you care for yourself at home? Work with your doctor to come up with a bladder training program that is right for you. You may use one or more of the following methods. Delayed urination · In the beginning, try to keep from urinating for 5 minutes after you first feel the need to go. · While you wait, take deep, slow breaths to relax. Kegel exercises can also help you delay the need to go to the bathroom. · After some practice, when you can easily wait 5 minutes to urinate, try to wait 10 minutes before you urinate. · Slowly increase the waiting period until you are able to control when you have to urinate. Scheduled urination · Empty your bladder when you first wake up in the morning. · Schedule times throughout the day when you will urinate. · Start by going to the bathroom every hour, even if you don't need to go. · Slowly increase the time between trips to the bathroom. · When you have found a schedule that works well for you, keep doing it. · If you wake up during the night and have to urinate, do it. Apply your schedule to waking hours only. Kegel exercises These tighten and strengthen pelvic muscles, which can help you control the flow of urine. To do Kegel exercises: 
· Squeeze the same muscles you would use to stop your urine. Your belly and thighs should not move. · Hold the squeeze for 3 seconds, and then relax for 3 seconds. · Start with 3 seconds. Then add 1 second each week until you are able to squeeze for 10 seconds. · Repeat the exercise 10 to 15 times a session. Do three or more sessions a day. When should you call for help? Watch closely for changes in your health, and be sure to contact your doctor if: 
  · Your incontinence is getting worse.  
  · You do not get better as expected. Where can you learn more? Go to http://margaret-ilene.info/. Enter A585 in the search box to learn more about \"Bladder Training: Care Instructions. \" Current as of: May 12, 2017 Content Version: 11.7 © 8769-9101 [employer]. Care instructions adapted under license by [employer] (which disclaims liability or warranty for this information). If you have questions about a medical condition or this instruction, always ask your healthcare professional. [address] any warranty or liability for your use of this information. Introducing [facility] & HEALTH SERVICES! [facility] introduces joblocal patient portal. Now you can access parts of your medical record, email your doctor's office, and request medication refills online. 1. In your internet browser, go to [email or web address]. [employer]/Easel Learn 2. Click on the First Time User? Click Here link in the Sign In box. You will see the New Member Sign Up page. 3. Enter your joblocal Access Code exactly as it appears below.  You will not need to use this code after youve completed the sign-up process. If you do not sign up before the expiration date, you must request a new code. · Sunsea Access Code: [email or web address] Expires: 11/18/2018  2:06 PM 
 
4. Enter the last four digits of your Social Security Number (xxxx) and Date of Birth (mm/dd/yyyy) as indicated and click Submit. You will be taken to the next sign-up page. 5. Create a Sunsea ID. This will be your Sunsea login ID and cannot be changed, so think of one that is secure and easy to remember. 6. Create a Sunsea password. You can change your password at any time. 7. Enter your Password Reset Question and Answer. This can be used at a later time if you forget your password. 8. Enter your e-mail address. You will receive e-mail notification when new information is available in [address]. 9. Click Sign Up. You can now view and download portions of your medical record. 10. Click the Download Summary menu link to download a portable copy of your medical information. If you have questions, please visit the Frequently Asked Questions section of the Sunsea website. Remember, Sunsea is NOT to be used for urgent needs. For medical emergencies, dial 911. Now available from your iPhone and Android! Please provide this summary of care documentation to your next provider. Your primary care clinician is listed as Petey Lewis. If you have any questions after today's visit, please call [phone].

## 2018-08-20 NOTE — PROGRESS NOTES
HISTORY OF PRESENT ILLNESS  Sophie Black is a 47 y.o. female. HPI Comments: 48 yo female here to establish care, c/o urinary incontinence. She is accompanied by her  who reports pt's sister has guardianship. UI: worsening over past few years. She states she wears a pad. Has regular leakage. Denies dysuria. Sx worse when she walks on stairs. Notes some leakage with cough or sneeze. Has paperwork for adult day center. Needs PPD. When asked if she has been exposed to anyone high risk for PPD, she reports her boyfriend has TB but not treated. Psych: ADHD, schizo, bipolar d/o per patient. She is asking for refill of ambien.  is not sure if this was medication that was discontinued. Has been receiving from her psychiatrist.  indicates this as last filled 8/6/18. Difficult historian. Establish Care   Pertinent negatives include no chest pain, no headaches and no shortness of breath. Review of Systems   Constitutional: Negative for chills, fever and weight loss. HENT: Negative for congestion and ear pain. Eyes: Negative for blurred vision and pain. Respiratory: Negative for cough and shortness of breath. Cardiovascular: Negative for chest pain, palpitations and leg swelling. Gastrointestinal: Negative for nausea and vomiting. Genitourinary: Positive for frequency and urgency. Negative for dysuria. Musculoskeletal: Negative for joint pain and myalgias. Skin: Negative for itching and rash. Neurological: Negative for dizziness, tingling and headaches. Past Medical History:   Diagnosis Date    Depression     Psychotic disorder      Past Surgical History:   Procedure Laterality Date    BREAST SURGERY PROCEDURE UNLISTED Bilateral 2000    BREAST AUGMENTATION     Current Outpatient Prescriptions on File Prior to Visit   Medication Sig Dispense Refill    clonazePAM (KLONOPIN) 0.5 mg tablet Take 1 Tab by mouth nightly. Max Daily Amount: 0.5 mg.  Indications: Panic Disorder 7 Tab 0    haloperidol (HALDOL) 10 mg tablet Take 1 Tab by mouth nightly. Indications: Psychotic Disorder 14 Tab 0    methylphenidate (RITALIN) 20 mg tablet take 1 tablet by mouth twice a day for 30 DAYS  0    methylphenidate HCl (RITALIN) 20 mg tablet Ritalin 20  Indications: Attention-Deficit Hyperactivity Disorder 14 Tab 0     No current facility-administered medications on file prior to visit. Allergies   Allergen Reactions    Adderall [Dextroamphetamine-Amphetamine] Anaphylaxis     \"makes me have a heart attack\"    Chicken Derived Other (comments)    Erythromycin Angioedema    Fish Derived Other (comments)     swelling     Family History   Problem Relation Age of Onset    Heart Disease Mother     Cancer Mother      breast    Diabetes Mother     Prostate Cancer Father     Cancer Father     Diabetes Sister     Diabetes Brother      Social History   Substance Use Topics    Smoking status: Current Every Day Smoker     Packs/day: 2.00     Types: Cigarettes    Smokeless tobacco: Never Used    Alcohol use No       Physical Exam   Constitutional: She appears well-developed and well-nourished. No distress. /86 (BP 1 Location: Right arm, BP Patient Position: Sitting)  Pulse (!) 104  Temp 97.7 °F (36.5 °C) (Oral)   Resp 22  Ht 5' 7\" (1.702 m)  Wt 270 lb 3.2 oz (122.6 kg)  SpO2 95%  BMI 42.32 kg/m2     Eyes: EOM are normal. Right eye exhibits no discharge. Left eye exhibits no discharge. No scleral icterus. Neck: Neck supple. Cardiovascular: Normal rate, regular rhythm and normal heart sounds. Exam reveals no gallop and no friction rub. No murmur heard. Pulmonary/Chest: Effort normal and breath sounds normal. No respiratory distress. She has no wheezes. She has no rales. Abdominal: Soft. She exhibits no distension. There is no tenderness. There is no rebound and no guarding. Musculoskeletal: She exhibits no edema or tenderness.    Lymphadenopathy:     She has no cervical adenopathy. Neurological: She is alert. She exhibits normal muscle tone. Skin: Skin is warm and dry. Psychiatric: Her affect is blunt. Her speech is delayed. Lab Results   Component Value Date/Time    Sodium 140 03/03/2018 04:41 PM    Potassium 3.9 03/03/2018 04:41 PM    Chloride 106 03/03/2018 04:41 PM    CO2 28 03/03/2018 04:41 PM    Anion gap 6 03/03/2018 04:41 PM    Glucose 121 (H) 03/03/2018 04:41 PM    BUN 11 03/03/2018 04:41 PM    Creatinine 0.77 03/03/2018 04:41 PM    BUN/Creatinine ratio 14 03/03/2018 04:41 PM    GFR est AA >60 03/03/2018 04:41 PM    GFR est non-AA >60 03/03/2018 04:41 PM    Calcium 8.7 03/03/2018 04:41 PM     Lab Results   Component Value Date/Time    WBC 12.1 03/03/2018 04:41 PM    HGB 16.2 (H) 03/03/2018 04:41 PM    HCT 46.3 (H) 03/03/2018 04:41 PM    PLATELET 611 02/93/4285 04:41 PM    MCV 95.1 03/03/2018 04:41 PM   No results found for: CHOL, CHOLPOCT, CHOLX, CHLST, CHOLV, HDL, HDLPOC, LDL, LDLCPOC, LDLC, DLDLP, VLDLC, VLDL, TGLX, TRIGL, TRIGP, TGLPOCT, CHHD, CHHDX  No results found for: HBA1C, HGBE8, TSU4VNMA, GNI9RWGI    ASSESSMENT and PLAN    ICD-10-CM ICD-9-CM    1. Mixed stress and urge urinary incontinence N39.46 788.33 URINALYSIS W/ RFLX MICROSCOPIC   2. BMI 40.0-44.9, adult (HCC) Z68.41 V85.41 CBC W/O DIFF      METABOLIC PANEL, COMPREHENSIVE      TSH 3RD GENERATION   3. Schizophrenia, unspecified type ([facility] F20.9 295.90    4. FH: diabetes mellitus Z83.3 V18.0 HEMOGLOBIN A1C WITH EAG   5. Screening, lipid Z13.220 V77.91 LIPID PANEL   6. Screening-pulmonary TB Z11.1 V74.1 AMB POC TUBERCULOSIS, INTRADERMAL (SKIN TEST)   Old records requested. Discussed that she should see her psychiatrist for ambien refill as he has been prescribing this, risk with her other psychiatric medications. Asked that she also see psychiatry to complete paperwork for adult day care given nature of questions. Will place PPD today. Screening labs ordered.    Can try detrol for urinary sx

## 2018-08-20 NOTE — PATIENT INSTRUCTIONS
Bladder Training: Care Instructions  Your Care Instructions    Bladder training is used to treat urge incontinence and stress incontinence. Urge incontinence means that the need to urinate comes on so fast that you can't get to a toilet in time. Stress incontinence means that you leak urine because of pressure on your bladder. For example, it may happen when you laugh, cough, or lift something heavy. Bladder training can increase how long you can wait before you have to urinate. It can also help your bladder hold more urine. And it can give you better control over the urge to urinate. It is important to remember that bladder training takes a few weeks to a few months to make a difference. You may not see results right away, but don't give up. Follow-up care is a key part of your treatment and safety. Be sure to make and go to all appointments, and call your doctor if you are having problems. It's also a good idea to know your test results and keep a list of the medicines you take. How can you care for yourself at home? Work with your doctor to come up with a bladder training program that is right for you. You may use one or more of the following methods. Delayed urination  · In the beginning, try to keep from urinating for 5 minutes after you first feel the need to go. · While you wait, take deep, slow breaths to relax. Kegel exercises can also help you delay the need to go to the bathroom. · After some practice, when you can easily wait 5 minutes to urinate, try to wait 10 minutes before you urinate. · Slowly increase the waiting period until you are able to control when you have to urinate. Scheduled urination  · Empty your bladder when you first wake up in the morning. · Schedule times throughout the day when you will urinate. · Start by going to the bathroom every hour, even if you don't need to go. · Slowly increase the time between trips to the bathroom.   · When you have found a schedule that works well for you, keep doing it. · If you wake up during the night and have to urinate, do it. Apply your schedule to waking hours only. Kegel exercises  These tighten and strengthen pelvic muscles, which can help you control the flow of urine. To do Kegel exercises:  · Squeeze the same muscles you would use to stop your urine. Your belly and thighs should not move. · Hold the squeeze for 3 seconds, and then relax for 3 seconds. · Start with 3 seconds. Then add 1 second each week until you are able to squeeze for 10 seconds. · Repeat the exercise 10 to 15 times a session. Do three or more sessions a day. When should you call for help? Watch closely for changes in your health, and be sure to contact your doctor if:    · Your incontinence is getting worse.     · You do not get better as expected. Where can you learn more? Go to http://margaret-ilene.info/. Enter J651 in the search box to learn more about \"Bladder Training: Care Instructions. \"  Current as of: May 12, 2017  Content Version: 11.7  © 9876-0501 [employer]. Care instructions adapted under license by Noom (which disclaims liability or warranty for this information). If you have questions about a medical condition or this instruction, always ask your healthcare professional. [address] any warranty or liability for your use of this information.

## 2018-08-21 NOTE — PROGRESS NOTES
Please let her know that her urine shows glucose. It is possible her urinary symptoms could be due to diabetes. She should have the blood work done as ordered at the time of her visit.

## 2018-08-22 ENCOUNTER — DOCUMENTATION ONLY (OUTPATIENT)
Dept: INTERNAL MEDICINE CLINIC | Age: 53
End: 2018-08-22

## 2018-08-22 NOTE — PROGRESS NOTES
PPD Reading Note  [facility] Result:0 mm induration.   Interpretation: Negative  Allergic reaction: no

## 2018-08-28 ENCOUNTER — HOSPITAL ENCOUNTER (OUTPATIENT)
Dept: LAB | Age: 53
Discharge: HOME | End: 2018-08-28
Payer: MEDICAID

## 2018-08-28 DIAGNOSIS — Z83.3 FH: DIABETES MELLITUS: ICD-10-CM

## 2018-08-28 DIAGNOSIS — Z13.220 SCREENING, LIPID: ICD-10-CM

## 2018-08-28 LAB
ALBUMIN SERPL-MCNC: 3.5 G/DL (ref 3.4–5)
ALBUMIN/GLOB SERPL: 1.2 {RATIO} (ref 0.8–1.7)
ALP SERPL-CCNC: 96 U/L (ref 45–117)
ALT SERPL-CCNC: 46 U/L (ref 13–56)
ANION GAP SERPL CALC-SCNC: 8 MMOL/L (ref 3–18)
AST SERPL-CCNC: 24 U/L (ref 15–37)
BILIRUB SERPL-MCNC: 0.4 MG/DL (ref 0.2–1)
BUN SERPL-MCNC: 15 MG/DL (ref 7–18)
BUN/CREAT SERPL: 17 (ref 12–20)
CALCIUM SERPL-MCNC: 8.6 MG/DL (ref 8.5–10.1)
CHLORIDE SERPL-SCNC: 108 MMOL/L (ref 100–108)
CHOLEST SERPL-MCNC: 208 MG/DL
CO2 SERPL-SCNC: 25 MMOL/L (ref 21–32)
CREAT SERPL-MCNC: 0.87 MG/DL (ref 0.6–1.3)
ERYTHROCYTE [DISTWIDTH] IN BLOOD BY AUTOMATED COUNT: 12.8 % (ref 11.6–14.5)
EST. AVERAGE GLUCOSE BLD GHB EST-MCNC: 128 MG/DL
GLOBULIN SER CALC-MCNC: 3 G/DL (ref 2–4)
GLUCOSE SERPL-MCNC: 165 MG/DL (ref 74–99)
HBA1C MFR BLD: 6.1 % (ref 4.2–5.6)
HCT VFR BLD AUTO: 47.1 % (ref 35–45)
HDLC SERPL-MCNC: 28 MG/DL (ref 40–60)
HDLC SERPL: 7.4 {RATIO} (ref 0–5)
HGB BLD-MCNC: 16.1 G/DL (ref 12–16)
LDLC SERPL CALC-MCNC: 112.6 MG/DL (ref 0–100)
LIPID PROFILE,FLP: ABNORMAL
MCH RBC QN AUTO: 32.6 PG (ref 24–34)
MCHC RBC AUTO-ENTMCNC: 34.2 G/DL (ref 31–37)
MCV RBC AUTO: 95.3 FL (ref 74–97)
PLATELET # BLD AUTO: 238 K/UL (ref 135–420)
PMV BLD AUTO: 11.8 FL (ref 9.2–11.8)
POTASSIUM SERPL-SCNC: 4.3 MMOL/L (ref 3.5–5.5)
PROT SERPL-MCNC: 6.5 G/DL (ref 6.4–8.2)
RBC # BLD AUTO: 4.94 M/UL (ref 4.2–5.3)
SODIUM SERPL-SCNC: 141 MMOL/L (ref 136–145)
TRIGL SERPL-MCNC: 337 MG/DL (ref ?–150)
TSH SERPL DL<=0.05 MIU/L-ACNC: 1.8 UIU/ML (ref 0.36–3.74)
VLDLC SERPL CALC-MCNC: 67.4 MG/DL
WBC # BLD AUTO: 10.9 K/UL (ref 4.6–13.2)

## 2018-08-28 PROCEDURE — 84443 ASSAY THYROID STIM HORMONE: CPT | Performed by: INTERNAL MEDICINE

## 2018-08-28 PROCEDURE — 80053 COMPREHEN METABOLIC PANEL: CPT | Performed by: INTERNAL MEDICINE

## 2018-08-28 PROCEDURE — 80061 LIPID PANEL: CPT | Performed by: INTERNAL MEDICINE

## 2018-08-28 PROCEDURE — 83036 HEMOGLOBIN GLYCOSYLATED A1C: CPT | Performed by: INTERNAL MEDICINE

## 2018-08-28 PROCEDURE — 85027 COMPLETE CBC AUTOMATED: CPT | Performed by: INTERNAL MEDICINE

## 2018-08-28 PROCEDURE — 36415 COLL VENOUS BLD VENIPUNCTURE: CPT | Performed by: INTERNAL MEDICINE

## 2018-09-04 ENCOUNTER — TELEPHONE (OUTPATIENT)
Dept: INTERNAL MEDICINE CLINIC | Age: 53
End: 2018-09-04

## 2018-09-04 NOTE — TELEPHONE ENCOUNTER
Patient's sister You Ospina (not listed on PHI) is calling asking about sister's lab results. I informed Ms. Daniel Lynch she is not listed on PHI, she states she is patient's legal guardian.

## 2018-09-05 ENCOUNTER — TELEPHONE (OUTPATIENT)
Dept: INTERNAL MEDICINE CLINIC | Age: 53
End: 2018-09-05

## 2018-09-05 NOTE — TELEPHONE ENCOUNTER
Incoming call from pt sister Bandar May. 2 pt identifiers confirmed. Pt sister informed that her legal information is not in pt chart at this time. Pt sister talked with this nurse for 20 minutes stating that she is pt POA and it is very difficult dealing with pt and her mental illness. Pt sister states that pt calls her several times a day for different issues and she is now calling her frequently because she does not understand the blood work letter that was sent to her home. Pt sister feels that this letter should be more simple so that pt can stop calling her. Pt sister advised that she can go to sister and review letter with her and she should fax our office any documentation of her guardianship of pt. Pt sister verbalized understanding. No other questions at this time.

## 2018-09-05 NOTE — TELEPHONE ENCOUNTER
Attempted to contact pt at  number, no answer. m for pt to return call to office at [phone]. Will continue to try to contact pt.

## 2018-09-05 NOTE — TELEPHONE ENCOUNTER
September 4, 2018            1:38 PM      Michelledelano Franknica contacted Me    Me        1:38 PM   Note      Patient's sister Arcelia Benavides (not listed on PHI) is calling asking about sister's lab results. I informed Ms. Naveed Camarillo she is not listed on PHI, she states she is patient's legal guardian.                   1:40 PM   You routed this conversation to [facility] Nurse [place]    September 5, 2018            9:39 AM      Suzon Hodgkins, LPN contacted Crow Hernanedz LPN        2:61 AM   Note      Attempted to contact pt at  number, no answer. Lvm for pt to return call to office at [phone]. Will continue to try to contact pt.                Suzon Hodgkins, LPN        69:19 AM   Note      Incoming call from pt sister Layne Lazar. 2 pt identifiers confirmed. Pt sister informed that her legal information is not in pt chart at this time. Pt sister talked with this nurse for 20 minutes stating that she is pt POA and it is very difficult dealing with pt and her mental illness. Pt sister states that pt calls her several times a day for different issues and she is now calling her frequently because she does not understand the blood work letter that was sent to her home. Pt sister feels that this letter should be more simple so that pt can stop calling her. Pt sister advised that she can go to sister and review letter with her and she should fax our office any documentation of her guardianship of pt. Pt sister verbalized understanding. No other questions at this time. UPDATE: Arcelia Benavides sent financial POA documents. Documents states Bárbara Bellamy has authorization to receive information in regards to patient's medical matters and Arcelia Benavides is patient's financial POA . I spoke to practice manager Jeff Lopes in regards to matter, I was instructed to contact Arcelia Benavides and inform her because she does not have medical POA we cannot speak to her in regards to medical matters.  In voicemail I informed Arcelia Benavides unless patient signs PHI in office or has a medical POA we cannot speak to her in regards to patient's medical matters. I called and left voicemail informing John Ken of this. Document scanned into chart.

## 2018-09-06 NOTE — TELEPHONE ENCOUNTER
Incoming call from pt. 2 pt identifiers confirmed. Pt states she received a letter from Dr Chase Apodaca with her lab results and she wants to know if she has diabetes. This nurse read comments from Dr Chase Apodaca from letter that was mailed to pt. Informed pt that per Dr Chase Apodaca, pt is PRE diabetic and she should watch her diet and exercise to help lower her A1C. Pt verbalized understanding and also states that she wants to allow release of information to [facility]. Informed pt that she needs to sign a release of information form and list [facility] along with what information she would like released to [facility]. Pt verbalized understanding. No other questions or concerns at this time.

## 2018-09-06 NOTE — TELEPHONE ENCOUNTER
Patient called asking for Veronica Douglas. I asked patient what would she like to speak to the nurse in regards to, she stated about getting her medical records. I informed patient that that isn't a nursing matter but front office. She requested that we fax a medical release form to her and she will sign it and fax it back. I informed patient we cannot accept a faxed  medical release form but she would need to sign form in person. I was explaining the reason as to why and patient disconnected call.

## 2018-09-06 NOTE — TELEPHONE ENCOUNTER
Patient called again asking for Ilda Kelly. I asked patient what would she like to speak to the nurse in regards, she stated to get medical records. I informed patient that she will have to come into office and sign either a medical release form to release health records or PHI for the practice to release information to any family members. Patient states \" okay, I can't come until Monday\". No further questions asked.

## 2018-09-06 NOTE — TELEPHONE ENCOUNTER
Pt contacted at home number. 2 pt identifiers confirmed. Pt states her sister wants access to her medical information and she needs paper work faxed to her sister. Pt informed of the information that has already been relayed to her from front office staff. Pt informed that if she wants to have her medical information released to her sister she needs to come into the office in person to sign release of information. No other questions or concerns at this time.

## 2018-09-06 NOTE — TELEPHONE ENCOUNTER
Jessica Lozoya called this morning angry stating that Cheryl Marina needs to read the forms, she doesn't want to hear nothing else just do what it says please

## 2018-09-07 ENCOUNTER — DOCUMENTATION ONLY (OUTPATIENT)
Dept: INTERNAL MEDICINE CLINIC | Age: 53
End: 2018-09-07

## 2018-11-19 RX ORDER — TOLTERODINE 2 MG/1
CAPSULE, EXTENDED RELEASE ORAL
Qty: 30 CAP | Refills: 2 | Status: SHIPPED | OUTPATIENT
Start: 2018-11-19 | End: 2019-02-11 | Stop reason: SDUPTHER

## 2019-01-01 ENCOUNTER — HOSPITAL ENCOUNTER (OUTPATIENT)
Dept: INFUSION THERAPY | Age: 54
Discharge: HOME | End: 2019-12-13
Payer: MEDICAID

## 2019-01-01 ENCOUNTER — HOSPITAL ENCOUNTER (OUTPATIENT)
Dept: INFUSION THERAPY | Age: 54
Discharge: HOME | End: 2019-11-15
Payer: MEDICAID

## 2019-01-01 ENCOUNTER — HOSPITAL ENCOUNTER (OUTPATIENT)
Dept: INFUSION THERAPY | Age: 54
Discharge: HOME | End: 2019-10-18
Payer: MEDICAID

## 2019-01-01 ENCOUNTER — HOSPITAL ENCOUNTER (OUTPATIENT)
Dept: INFUSION THERAPY | Age: 54
Discharge: HOME | End: 2019-09-20
Payer: MEDICAID

## 2019-01-01 VITALS
DIASTOLIC BLOOD PRESSURE: 85 MMHG | RESPIRATION RATE: 18 BRPM | SYSTOLIC BLOOD PRESSURE: 120 MMHG | HEART RATE: 83 BPM | TEMPERATURE: 97.1 F | OXYGEN SATURATION: 95 %

## 2019-01-01 VITALS
TEMPERATURE: 98 F | HEART RATE: 95 BPM | SYSTOLIC BLOOD PRESSURE: 126 MMHG | DIASTOLIC BLOOD PRESSURE: 84 MMHG | RESPIRATION RATE: 18 BRPM

## 2019-01-01 VITALS
SYSTOLIC BLOOD PRESSURE: 122 MMHG | RESPIRATION RATE: 19 BRPM | HEART RATE: 87 BPM | TEMPERATURE: 96.9 F | OXYGEN SATURATION: 96 % | DIASTOLIC BLOOD PRESSURE: 87 MMHG

## 2019-01-01 VITALS
HEART RATE: 91 BPM | TEMPERATURE: 97.9 F | RESPIRATION RATE: 18 BRPM | DIASTOLIC BLOOD PRESSURE: 88 MMHG | OXYGEN SATURATION: 94 % | SYSTOLIC BLOOD PRESSURE: 129 MMHG

## 2019-01-01 DIAGNOSIS — F20.9 CHRONIC SCHIZOPHRENIA (HCC): ICD-10-CM

## 2019-01-01 DIAGNOSIS — F20.9 CHRONIC SCHIZOPHRENIA (HCC): Primary | ICD-10-CM

## 2019-01-01 PROCEDURE — 74011250636 HC RX REV CODE- 250/636: Performed by: NURSE PRACTITIONER

## 2019-01-01 PROCEDURE — 96372 THER/PROPH/DIAG INJ SC/IM: CPT

## 2019-01-01 RX ORDER — ARIPIPRAZOLE 400 MG
400 KIT INTRAMUSCULAR ONCE
Status: CANCELLED | OUTPATIENT
Start: 2019-01-01 | End: 2019-01-01

## 2019-01-01 RX ORDER — ARIPIPRAZOLE 400 MG
400 KIT INTRAMUSCULAR ONCE
Status: COMPLETED | OUTPATIENT
Start: 2019-01-01 | End: 2019-01-01

## 2019-01-01 RX ORDER — ARIPIPRAZOLE 400 MG
400 KIT INTRAMUSCULAR ONCE
Status: CANCELLED | OUTPATIENT
Start: 2020-01-01 | End: 2020-01-01

## 2019-01-01 RX ADMIN — ARIPIPRAZOLE 400 MG: KIT at 14:36

## 2019-01-01 RX ADMIN — ARIPIPRAZOLE 400 MG: KIT at 15:01

## 2019-01-01 RX ADMIN — ARIPIPRAZOLE 400 MG: KIT at 13:56

## 2019-01-01 RX ADMIN — ARIPIPRAZOLE 400 MG: KIT at 15:12

## 2019-02-11 RX ORDER — TOLTERODINE 2 MG/1
CAPSULE, EXTENDED RELEASE ORAL
Qty: 30 CAP | Refills: 2 | Status: SHIPPED | OUTPATIENT
Start: 2019-02-11 | End: 2019-05-16 | Stop reason: SDUPTHER

## 2019-05-16 RX ORDER — TOLTERODINE 2 MG/1
CAPSULE, EXTENDED RELEASE ORAL
Qty: 30 CAP | Refills: 2 | Status: SHIPPED | OUTPATIENT
Start: 2019-05-16 | End: 2019-08-11 | Stop reason: SDUPTHER

## 2019-08-12 RX ORDER — TOLTERODINE 2 MG/1
CAPSULE, EXTENDED RELEASE ORAL
Qty: 30 CAP | Refills: 2 | Status: SHIPPED | OUTPATIENT
Start: 2019-08-12 | End: 2020-01-01

## 2019-09-20 NOTE — PROGRESS NOTES
[address] \[facility] [place] [facility]\"" Progress Note Date: 2019 Name: Aye Coates MRN: [id number] : 1965 Abilify Injection Ms. Dunlap arrived to [facility] at 1430. Ms. Juan Jose Ferrari was assessed and education was provided. Ms. Dunlap's vitals were reviewed. Visit Vitals /85 (BP 1 Location: Right arm, BP Patient Position: Sitting) Pulse 83 Temp 97.1 °F (36.2 °C) Resp 18 SpO2 95% Abilify 400 mg was administered as ordered IM in patient's Right deltoid ( right). Ms. Juan Jose Ferrari tolerated well without complaints. Ms. Juan Jose Ferrari was discharged from [place] [address] in stable condition at 1440. She is to return on 10/18/2019 at 1500 for her next appointment. Juan Cedeño RN 2019

## 2019-10-18 NOTE — PROGRESS NOTES
[facility] Progress Note Date: 2019 Name: Noe Reyna MRN: [id number] : 1965 Abilify Injection Ms. Dunlap arrived to [facility] [employer] Ms. Dunlap was assessed and education was provided. Ms. Dunlap's vitals were reviewed. Visit Vitals /88 (BP 1 Location: Left arm, BP Patient Position: Sitting) Pulse 91 Temp 97.9 °F (36.6 °C) Resp 18 SpO2 94% Breastfeeding? No  
 
 
 
Abilify 400 mg was administered as ordered IM in patient's left deltoid. Ms. Park Bowman tolerated well without complaints. Ms. Park Bowman was discharged from [place] [address] in stable condition at 1505. She is to return on 11/15/2019 at 1500 for her next appointment. John Raymundo RN 2019 
1505

## 2019-11-14 PROBLEM — F20.9 CHRONIC SCHIZOPHRENIA (HCC): Status: ACTIVE | Noted: 2019-01-01

## 2019-11-15 NOTE — PROGRESS NOTES
[facility] OPI Progress Note Date: November 15, 2019 Name: Noe Reyna MRN: [id number] : 1965 Abilify Injection Ms. Dunlap arrived to [facility] at 12 MsJackie Dunlap was assessed and education was provided. Ms. Dunlap's vitals were reviewed. Visit Vitals /84 (BP Patient Position: Sitting) Pulse 95 Temp 98 °F (36.7 °C) Resp 18 Abilify 400 mg was administered as ordered IM in patient's Right deltoid. Band aid placed at site. Ms. Park Bowman tolerated well without complaints. Ms. Park Bowman was discharged from [place] [address] in stable condition at 1515. She is to return on 19 at 1500 for her next appointment. Alejandra Walls RN November 15, 2019 
1513

## 2020-01-01 ENCOUNTER — DOCUMENTATION ONLY (OUTPATIENT)
Dept: FAMILY MEDICINE CLINIC | Facility: CLINIC | Age: 55
End: 2020-01-01

## 2020-01-01 ENCOUNTER — HOSPITAL ENCOUNTER (OUTPATIENT)
Dept: INFUSION THERAPY | Age: 55
Discharge: HOME | End: 2020-01-10
Payer: MEDICAID

## 2020-01-01 ENCOUNTER — HOSPITAL ENCOUNTER (OUTPATIENT)
Dept: INFUSION THERAPY | Age: 55
Discharge: HOME | End: 2020-02-07
Payer: MEDICAID

## 2020-01-01 ENCOUNTER — HOSPITAL ENCOUNTER (OUTPATIENT)
Dept: INFUSION THERAPY | Age: 55
Discharge: HOME | End: 2020-03-06
Payer: MEDICAID

## 2020-01-01 ENCOUNTER — TELEPHONE (OUTPATIENT)
Dept: FAMILY MEDICINE CLINIC | Facility: CLINIC | Age: 55
End: 2020-01-01

## 2020-01-01 ENCOUNTER — HOSPITAL ENCOUNTER (OUTPATIENT)
Dept: LAB | Age: 55
Discharge: HOME | End: 2020-01-28
Payer: MEDICAID

## 2020-01-01 ENCOUNTER — HOSPITAL ENCOUNTER (OUTPATIENT)
Dept: INFUSION THERAPY | Age: 55
End: 2020-01-01

## 2020-01-01 ENCOUNTER — OFFICE VISIT (OUTPATIENT)
Dept: FAMILY MEDICINE CLINIC | Facility: CLINIC | Age: 55
End: 2020-01-01

## 2020-01-01 ENCOUNTER — VIRTUAL VISIT (OUTPATIENT)
Dept: FAMILY MEDICINE CLINIC | Facility: CLINIC | Age: 55
End: 2020-01-01

## 2020-01-01 ENCOUNTER — APPOINTMENT (OUTPATIENT)
Dept: INFUSION THERAPY | Age: 55
End: 2020-01-01

## 2020-01-01 VITALS
HEART RATE: 88 BPM | TEMPERATURE: 98.1 F | DIASTOLIC BLOOD PRESSURE: 89 MMHG | RESPIRATION RATE: 18 BRPM | SYSTOLIC BLOOD PRESSURE: 127 MMHG

## 2020-01-01 VITALS
DIASTOLIC BLOOD PRESSURE: 82 MMHG | RESPIRATION RATE: 18 BRPM | TEMPERATURE: 98.2 F | HEART RATE: 83 BPM | SYSTOLIC BLOOD PRESSURE: 127 MMHG

## 2020-01-01 VITALS
TEMPERATURE: 97.5 F | OXYGEN SATURATION: 97 % | HEIGHT: 67 IN | RESPIRATION RATE: 17 BRPM | SYSTOLIC BLOOD PRESSURE: 110 MMHG | DIASTOLIC BLOOD PRESSURE: 80 MMHG | BODY MASS INDEX: 40.49 KG/M2 | HEART RATE: 74 BPM | WEIGHT: 258 LBS

## 2020-01-01 VITALS
HEART RATE: 85 BPM | DIASTOLIC BLOOD PRESSURE: 83 MMHG | OXYGEN SATURATION: 95 % | TEMPERATURE: 97.7 F | RESPIRATION RATE: 18 BRPM | SYSTOLIC BLOOD PRESSURE: 124 MMHG

## 2020-01-01 DIAGNOSIS — E78.2 MIXED HYPERLIPIDEMIA: Primary | ICD-10-CM

## 2020-01-01 DIAGNOSIS — F20.9 CHRONIC SCHIZOPHRENIA (HCC): ICD-10-CM

## 2020-01-01 DIAGNOSIS — Z23 ENCOUNTER FOR IMMUNIZATION: ICD-10-CM

## 2020-01-01 DIAGNOSIS — Z12.39 SCREENING FOR BREAST CANCER: ICD-10-CM

## 2020-01-01 DIAGNOSIS — F20.9 CHRONIC SCHIZOPHRENIA (HCC): Primary | ICD-10-CM

## 2020-01-01 DIAGNOSIS — E78.2 MIXED HYPERLIPIDEMIA: ICD-10-CM

## 2020-01-01 DIAGNOSIS — E66.01 OBESITY, MORBID (HCC): ICD-10-CM

## 2020-01-01 DIAGNOSIS — R73.09 ELEVATED HEMOGLOBIN A1C: ICD-10-CM

## 2020-01-01 DIAGNOSIS — D75.1 POLYCYTHEMIA: ICD-10-CM

## 2020-01-01 DIAGNOSIS — F17.210 CIGARETTE NICOTINE DEPENDENCE WITHOUT COMPLICATION: ICD-10-CM

## 2020-01-01 LAB
ALBUMIN SERPL-MCNC: 3.7 G/DL (ref 3.4–5)
ALBUMIN/GLOB SERPL: 1.4 {RATIO} (ref 0.8–1.7)
ALP SERPL-CCNC: 72 U/L (ref 45–117)
ALT SERPL-CCNC: 26 U/L (ref 13–56)
ANION GAP SERPL CALC-SCNC: 3 MMOL/L (ref 3–18)
AST SERPL-CCNC: 17 U/L (ref 10–38)
BASOPHILS # BLD: 0 K/UL (ref 0–0.1)
BASOPHILS NFR BLD: 0 % (ref 0–2)
BILIRUB SERPL-MCNC: 0.6 MG/DL (ref 0.2–1)
BUN SERPL-MCNC: 15 MG/DL (ref 7–18)
BUN/CREAT SERPL: 17 (ref 12–20)
CALCIUM SERPL-MCNC: 8.8 MG/DL (ref 8.5–10.1)
CHLORIDE SERPL-SCNC: 113 MMOL/L (ref 100–111)
CHOLEST SERPL-MCNC: 201 MG/DL
CO2 SERPL-SCNC: 27 MMOL/L (ref 21–32)
CREAT SERPL-MCNC: 0.88 MG/DL (ref 0.6–1.3)
DIFFERENTIAL METHOD BLD: ABNORMAL
EOSINOPHIL # BLD: 0.2 K/UL (ref 0–0.4)
EOSINOPHIL NFR BLD: 3 % (ref 0–5)
ERYTHROCYTE [DISTWIDTH] IN BLOOD BY AUTOMATED COUNT: 13.2 % (ref 11.6–14.5)
EST. AVERAGE GLUCOSE BLD GHB EST-MCNC: 103 MG/DL
GLOBULIN SER CALC-MCNC: 2.7 G/DL (ref 2–4)
GLUCOSE SERPL-MCNC: 103 MG/DL (ref 74–99)
HBA1C MFR BLD: 5.2 % (ref 4.2–5.6)
HCT VFR BLD AUTO: 46.9 % (ref 35–45)
HDLC SERPL-MCNC: 34 MG/DL (ref 40–60)
HDLC SERPL: 5.9 {RATIO} (ref 0–5)
HGB BLD-MCNC: 15.7 G/DL (ref 12–16)
LDLC SERPL CALC-MCNC: 132 MG/DL (ref 0–100)
LIPID PROFILE,FLP: ABNORMAL
LYMPHOCYTES # BLD: 3.1 K/UL (ref 0.9–3.6)
LYMPHOCYTES NFR BLD: 35 % (ref 21–52)
MCH RBC QN AUTO: 32.2 PG (ref 24–34)
MCHC RBC AUTO-ENTMCNC: 33.5 G/DL (ref 31–37)
MCV RBC AUTO: 96.3 FL (ref 74–97)
MONOCYTES # BLD: 0.6 K/UL (ref 0.05–1.2)
MONOCYTES NFR BLD: 7 % (ref 3–10)
NEUTS SEG # BLD: 4.9 K/UL (ref 1.8–8)
NEUTS SEG NFR BLD: 55 % (ref 40–73)
PLATELET # BLD AUTO: 227 K/UL (ref 135–420)
PMV BLD AUTO: 11.9 FL (ref 9.2–11.8)
POTASSIUM SERPL-SCNC: 4.4 MMOL/L (ref 3.5–5.5)
PROT SERPL-MCNC: 6.4 G/DL (ref 6.4–8.2)
RBC # BLD AUTO: 4.87 M/UL (ref 4.2–5.3)
SODIUM SERPL-SCNC: 143 MMOL/L (ref 136–145)
TRIGL SERPL-MCNC: 175 MG/DL (ref ?–150)
VLDLC SERPL CALC-MCNC: 35 MG/DL
WBC # BLD AUTO: 8.9 K/UL (ref 4.6–13.2)

## 2020-01-01 PROCEDURE — 74011250636 HC RX REV CODE- 250/636: Performed by: NURSE PRACTITIONER

## 2020-01-01 PROCEDURE — 80061 LIPID PANEL: CPT

## 2020-01-01 PROCEDURE — 36415 COLL VENOUS BLD VENIPUNCTURE: CPT

## 2020-01-01 PROCEDURE — 96372 THER/PROPH/DIAG INJ SC/IM: CPT

## 2020-01-01 PROCEDURE — 80053 COMPREHEN METABOLIC PANEL: CPT

## 2020-01-01 PROCEDURE — 83036 HEMOGLOBIN GLYCOSYLATED A1C: CPT

## 2020-01-01 PROCEDURE — 85025 COMPLETE CBC W/AUTO DIFF WBC: CPT

## 2020-01-01 RX ORDER — ARIPIPRAZOLE 400 MG
400 KIT INTRAMUSCULAR ONCE
Status: CANCELLED | OUTPATIENT
Start: 2020-01-01 | End: 2020-01-01

## 2020-01-01 RX ORDER — ARIPIPRAZOLE 400 MG
KIT INTRAMUSCULAR
COMMUNITY
Start: 2019-01-01

## 2020-01-01 RX ORDER — ARIPIPRAZOLE 400 MG
400 KIT INTRAMUSCULAR ONCE
Status: COMPLETED | OUTPATIENT
Start: 2020-01-01 | End: 2020-01-01

## 2020-01-01 RX ORDER — TOPIRAMATE 50 MG/1
TABLET, FILM COATED ORAL
COMMUNITY
Start: 2020-01-01

## 2020-01-01 RX ORDER — TRAZODONE HYDROCHLORIDE 100 MG/1
TABLET ORAL
COMMUNITY
Start: 2020-01-01

## 2020-01-01 RX ADMIN — ARIPIPRAZOLE 400 MG: KIT at 09:22

## 2020-01-01 RX ADMIN — ARIPIPRAZOLE 400 MG: KIT at 09:16

## 2020-01-01 RX ADMIN — ARIPIPRAZOLE 400 MG: KIT at 08:32

## 2020-01-10 NOTE — PROGRESS NOTES
[facility] Progress Note    Date: January 10, 2020    Name: Dwayne Tate    MRN: [id number]         : 1965     Abilify Injection      Ms. Dunlap arrived to [facility] at 0830. Ms. Britt Merino was assessed and education was provided. Ms. Dunlap's vitals were reviewed. Visit Vitals  /89 (BP 1 Location: Right arm, BP Patient Position: Sitting)   Pulse 88   Temp 98.1 °F (36.7 °C)   Resp 18   Breastfeeding No       Abilify 400 mg was administered as ordered IM in patient's Left deltoid. Band aid placed at site. Ms. Britt Merino tolerated well without complaints. Ms. Britt Merino was discharged from [place] [address] in stable condition at [place] [address] [facility]. She is to return on 20 at 0900 for her next appointment.     Starr Chow RN  January 10, 2020  1236

## 2020-01-28 PROBLEM — D75.1 POLYCYTHEMIA: Status: ACTIVE | Noted: 2020-01-01

## 2020-01-28 PROBLEM — R73.09 ELEVATED HEMOGLOBIN A1C: Status: ACTIVE | Noted: 2020-01-01

## 2020-01-28 PROBLEM — F32.A DEPRESSION: Status: RESOLVED | Noted: 2018-03-05 | Resolved: 2020-01-01

## 2020-01-28 PROBLEM — F33.9 RECURRENT DEPRESSION (HCC): Status: ACTIVE | Noted: 2018-03-05

## 2020-01-28 PROBLEM — E78.2 MIXED HYPERLIPIDEMIA: Status: ACTIVE | Noted: 2020-01-01

## 2020-01-28 PROBLEM — F17.210 CIGARETTE NICOTINE DEPENDENCE WITHOUT COMPLICATION: Status: ACTIVE | Noted: 2020-01-01

## 2020-01-28 NOTE — PROGRESS NOTES
aCrole Erickson is a 49 y.o.  female presents today for office visit for follow up. 1. Have you been to the ER, urgent care clinic since your last visit? Hospitalized since your last visit? No 
 
2. Have you seen or consulted any other health care providers outside of the [address] [place] [address] since your last visit? Include any pap smears or colon screening. No 
 
Upcoming Appts 
none Health Maintenance reviewed VORB:  
Orders Placed This Encounter  traZODone (DESYREL) 100 mg tablet  topiramate (TOPAMAX) 50 mg tablet  ABILIFY MAINTENA 400 mg injection Muna Andrews LPN

## 2020-01-28 NOTE — PATIENT INSTRUCTIONS
Learning About Low-Carbohydrate Diets for Weight Loss What is a low-carbohydrate diet? Low-carb diets avoid foods that are high in carbohydrate. These high-carb foods include pasta, bread, rice, cereal, fruits, and starchy vegetables. Instead, these diets usually have you eat foods that are high in fat and protein. Many people lose weight quickly on a low-carb diet. But the early weight loss is water. People on this diet often gain the weight back after they start eating carbs again. Not all diet plans are safe or work well. A lot of the evidence shows that low-carb diets aren't healthy. That's because these diets often don't include healthy foods like fruits and vegetables. Losing weight safely means balancing protein, fat, and carbs with every meal and snack. And low-carb diets don't always provide the vitamins, minerals, and fiber you need. If you have a serious medical condition, talk to your doctor before you try any diet. These conditions include kidney disease, heart disease, type 2 diabetes, high cholesterol, and high blood pressure. If you are pregnant, it may not be safe for your baby if you are on a low-carb diet. How can you lose weight safely? You might have heard that a diet plan helped another person lose weight. But that doesn't mean that it will work for you. It is very hard to stay on a diet that includes lots of big changes in your eating habits. If you want to get to a healthy weight and stay there, making healthy lifestyle changes will often work better than dieting. These steps can help. · Make a plan for change. Work with your doctor to create a plan that is right for you. · See a dietitian. He or she can show you how to make healthy changes in your eating habits. · Manage stress. If you have a lot of stress in your life, it can be hard to focus on making healthy changes to your daily habits. · Track your food and activity.  You are likely to do better at losing weight if you keep track of what you eat and what you do. Follow-up care is a key part of your treatment and safety. Be sure to make and go to all appointments, and call your doctor if you are having problems. It's also a good idea to know your test results and keep a list of the medicines you take. Where can you learn more? Go to http://margaret-ilene.info/. Enter A121 in the search box to learn more about \"Learning About Low-Carbohydrate Diets for Weight Loss. \" Current as of: November 7, 2018 Content Version: 12.2 © 4618-5687 [employer]. Care instructions adapted under license by [employer] (which disclaims liability or warranty for this information). If you have questions about a medical condition or this instruction, always ask your healthcare professional. [address] any warranty or liability for your use of this information.

## 2020-01-28 NOTE — PROGRESS NOTES
Internal Medicine Progress Note Today's Date:  2020 Patient:  Lilia Manzo Patient :  1965 Subjective: Chief Complaint Patient presents with [address] [place] [address]  Weight Management  Cholesterol Problem  Other  
  elevated A1c  
 Nicotine Dependence 3 most recent PHQ Screens 2020 Little interest or pleasure in doing things Not at all Feeling down, depressed, irritable, or hopeless Nearly every day Total Score PHQ 2 3 Trouble falling or staying asleep, or sleeping too much More than half the days Feeling tired or having little energy Several days Poor appetite, weight loss, or overeating Nearly every day Feeling bad about yourself - or that you are a failure or have let yourself or your family down Not at all Trouble concentrating on things such as school, work, reading, or watching TV Not at all Moving or speaking so slowly that other people could have noticed; or the opposite being so fidgety that others notice Not at all Thoughts of being better off dead, or hurting yourself in some way Not at all PHQ 9 Score 9 Past Medical History:  
Diagnosis Date  Depression  Elevated hemoglobin A1c 2020  Mixed hyperlipidemia 2020  Polycythemia 2020  Psychotic disorder ([facility] [address] Past Surgical History:  
Procedure Laterality Date  BREAST SURGERY PROCEDURE UNLISTED Bilateral 2000 BREAST AUGMENTATION  
 
 reports that she has been smoking cigarettes. She has been smoking about 2.00 packs per day. She has never used smokeless tobacco. She reports that she does not drink alcohol or use drugs. Family History Problem Relation Age of Onset  Heart Disease Mother  Cancer Mother   
     breast  
 Diabetes Mother  Prostate Cancer Father  Cancer Father  Diabetes Sister  Diabetes Brother Allergies Allergen Reactions  Adderall [Dextroamphetamine-Amphetamine] Anaphylaxis \"makes me have a heart attack\"  Chicken Derived Other (comments)  Erythromycin Angioedema  Fish Derived Other (comments)  
  swelling Current Outpatient Meds Current Outpatient Medications on File Prior to Visit Medication Sig Dispense Refill  traZODone (DESYREL) 100 mg tablet take 1 tablet by mouth at bedtime  topiramate (TOPAMAX) 50 mg tablet TAKE 1 TABLET BY MOUTH DAILY  ABILIFY MAINTENA 400 mg injection  [DISCONTINUED] tolterodine ER (DETROL-LA) 2 mg ER capsule take 1 capsule by mouth once daily 30 Cap 2  
 [DISCONTINUED] zolpidem (AMBIEN) 10 mg tablet Take 20 mg by mouth nightly as needed for Sleep.  [DISCONTINUED] methylphenidate HCl (RITALIN) 20 mg tablet Ritalin 20  Indications: Attention-Deficit Hyperactivity Disorder 14 Tab 0  [DISCONTINUED] clonazePAM (KLONOPIN) 0.5 mg tablet Take 1 Tab by mouth nightly. Max Daily Amount: 0.5 mg. Indications: Panic Disorder 7 Tab 0  [DISCONTINUED] haloperidol (HALDOL) 10 mg tablet Take 1 Tab by mouth nightly. Indications: Psychotic Disorder 14 Tab 0  [DISCONTINUED] methylphenidate (RITALIN) 20 mg tablet take 1 tablet by mouth twice a day for 30 DAYS  0 No current facility-administered medications on file prior to visit. Review of Systems CV:      chest pain, palpitations PULM:  SOB, wheezing, cough, sputum production Objective:    
 
Visit Vitals /80 (BP 1 Location: Left arm, BP Patient Position: Sitting) Pulse 74 Temp 97.5 °F (36.4 °C) (Oral) Resp 17 Ht 5' 7\" (1.702 m) Wt 258 lb (117 kg) LMP 01/22/2020 SpO2 97% BMI 40.41 kg/m² General:   Well-nourished, well-groomed, pleasant, alert, in no acute distress Head:  Normocephalic, atraumatic Ears:  External ears WNL Nose:  External nares WNL Psych:  No pressured speech, no abnormal thought content {Choose one or more Last Lab values; press DELETE if none desired:[id number]} Assessment/Plan & Orders: {Assessment and Plan:25146} Information given on ketogenic/IF diet with exercise *Patient verbalized understanding and agreement with the plan. Patient was given an after-visit summary. Wang Lao. Bebeto Gray MD - Internal Medicine 1/28/2020, 11:06 AM 
[facility] [address] [facility], [address] Phone [phone] Fax [phone]

## 2020-01-28 NOTE — PROGRESS NOTES
Internal Medicine Progress Note Today's Date:  2020 Patient:  Sharon Ibanez Patient :  1965 Subjective: Chief Complaint Patient presents with Irwin Hoover Establish Care  Weight Management  Cholesterol Problem  Other  
  elevated A1c  
 Nicotine Dependence Smoker This is a chronic problem, new to me. This is not at goal. Pt smokes 2-3 cigarettes per day. Pt is ready to quit. Obesity Class III/Hyperlipidemia/Elevated A1c This is a chronic problem, new to me. This is not at goal. Pt tries to eat a healthy diet. Pt lost weight since the last visit. 3 most recent PHQ Screens 2020 Little interest or pleasure in doing things Not at all Feeling down, depressed, irritable, or hopeless Nearly every day Total Score PHQ 2 3 Trouble falling or staying asleep, or sleeping too much More than half the days Feeling tired or having little energy Several days Poor appetite, weight loss, or overeating Nearly every day Feeling bad about yourself - or that you are a failure or have let yourself or your family down Not at all Trouble concentrating on things such as school, work, reading, or watching TV Not at all Moving or speaking so slowly that other people could have noticed; or the opposite being so fidgety that others notice Not at all Thoughts of being better off dead, or hurting yourself in some way Not at all PHQ 9 Score 9 Past Medical History:  
Diagnosis Date  Cigarette nicotine dependence without complication 8556  Depression  Elevated hemoglobin A1c 2020  Mixed hyperlipidemia 2020  Polycythemia 2020  Psychotic disorder ([facility] [address] Past Surgical History:  
Procedure Laterality Date  BREAST SURGERY PROCEDURE UNLISTED Bilateral 2000 BREAST AUGMENTATION  
 
 reports that she has been smoking cigarettes. She has been smoking about 2.00 packs per day.  She has never used smokeless tobacco. She reports that she does not drink alcohol or use drugs. Family History Problem Relation Age of Onset  Heart Disease Mother  Cancer Mother   
     breast  
 Diabetes Mother  Prostate Cancer Father  Cancer Father  Diabetes Sister  Diabetes Brother Allergies Allergen Reactions  Adderall [Dextroamphetamine-Amphetamine] Anaphylaxis \"makes me have a heart attack\"  Chicken Derived Other (comments)  Erythromycin Angioedema  Fish Derived Other (comments)  
  swelling Current Outpatient Meds Current Outpatient Medications on File Prior to Visit Medication Sig Dispense Refill  traZODone (DESYREL) 100 mg tablet take 1 tablet by mouth at bedtime  topiramate (TOPAMAX) 50 mg tablet TAKE 1 TABLET BY MOUTH DAILY  ABILIFY MAINTENA 400 mg injection  [DISCONTINUED] tolterodine ER (DETROL-LA) 2 mg ER capsule take 1 capsule by mouth once daily 30 Cap 2  
 [DISCONTINUED] zolpidem (AMBIEN) 10 mg tablet Take 20 mg by mouth nightly as needed for Sleep.  [DISCONTINUED] methylphenidate HCl (RITALIN) 20 mg tablet Ritalin 20  Indications: Attention-Deficit Hyperactivity Disorder 14 Tab 0  [DISCONTINUED] clonazePAM (KLONOPIN) 0.5 mg tablet Take 1 Tab by mouth nightly. Max Daily Amount: 0.5 mg. Indications: Panic Disorder 7 Tab 0  [DISCONTINUED] haloperidol (HALDOL) 10 mg tablet Take 1 Tab by mouth nightly. Indications: Psychotic Disorder 14 Tab 0  [DISCONTINUED] methylphenidate (RITALIN) 20 mg tablet take 1 tablet by mouth twice a day for 30 DAYS  0 No current facility-administered medications on file prior to visit. Review of Systems CV:      chest pain, palpitations PULM:  SOB, wheezing, cough, sputum production Objective:    
 
Visit Vitals /80 (BP 1 Location: Left arm, BP Patient Position: Sitting) Pulse 74 Temp 97.5 °F (36.4 °C) (Oral) Resp 17 Ht 5' 7\" (1.702 m) Wt 258 lb (117 kg) LMP 01/22/2020 SpO2 97% BMI 40.41 kg/m² General:   Well-nourished, well-groomed, pleasant, alert, in no acute distress Head:  Normocephalic, atraumatic Ears:  External ears WNL Nose:  External nares WNL Psych:  No pressured speech, no abnormal thought content Lab Results Component Value Date/Time GFR est non-AA >60 08/28/2018 11:17 AM  
 GFR est AA >60 08/28/2018 11:17 AM  
 Creatinine 0.87 08/28/2018 11:17 AM  
 BUN 15 08/28/2018 11:17 AM  
 Sodium 141 08/28/2018 11:17 AM  
 Potassium 4.3 08/28/2018 11:17 AM  
 Chloride 108 08/28/2018 11:17 AM  
 CO2 25 08/28/2018 11:17 AM  
  
Assessment/Plan & Orders: ICD-10-CM ICD-9-CM 1. Mixed hyperlipidemia E78.2 272.2 LIPID PANEL  
   METABOLIC PANEL, COMPREHENSIVE 2. Elevated hemoglobin A1c R73.09 790.29 HEMOGLOBIN A1C WITH EAG 3. Obesity, morbid ([facility] [address] E66.01 278.01   
4. Cigarette nicotine dependence without complication N04.638 920.7 5. Polycythemia D75.1 238.4 CBC WITH AUTOMATED DIFF 6. Screening for breast cancer Z12.39 V76.10 ANNITA MAMMO BI SCREENING INCL CAD 7. Encounter for immunization Z23 V03.89 pneumococcal 23-valent (PNEUMOVAX 23) 25 mcg/0.5 mL injection  
   diph,Pertuss,Acell,,Tet Vac-PF (ADACEL) 2 Lf-(2.5-5-3-5 mcg)-5Lf/0.5 mL susp  
   varicella-zoster (SHINGRIX GE ANTIGEN COMPONENT) injection Recommend smoking cessation Information given on ketogenic/IF diet with exercise Follow up with psychiatry Follow-up and Dispositions · Return in about 3 months (around 4/28/2020) for Well woman exam, Go over labs/imaging, 30 minutes. *Patient verbalized understanding and agreement with the plan. Patient was given an after-visit summary. Devyn Chavez. Millicent Chiu MD - Internal Medicine 1/28/2020, 11:07 AM 
[address], [address] Phone [phone] Fax [phone]

## 2020-01-30 NOTE — TELEPHONE ENCOUNTER
Patient called and would like to get the results of her lab work. Please call her back at #[phone].

## 2020-02-07 NOTE — PROGRESS NOTES
[facility] Progress Note Date: 2020 Name: Joel Griffith MRN: [id number] : 1965 Abilify Injection Ms. Dunlap arrived to [facility] at [address]. Ms. Franck Porter was assessed and education was provided. Ms. Dunlap's vitals were reviewed. Visit Vitals /82 (BP 1 Location: Right arm, BP Patient Position: Sitting) Pulse 83 Temp 98.2 °F (36.8 °C) Resp 18 Abilify 400 mg was administered as ordered IM in patient's Right deltoid. Band aid placed at site. Ms. Franck Porter tolerated well without complaints. Ms. Franck Porter was discharged from [place] [address] in stable condition at 6278. She is to return on 3/6/20 at 0900 for her next appointment. Erica Alexander RN 2020

## 2020-08-14 NOTE — PROGRESS NOTES
[place]  called requesting to \"sign\" death certificate because the patient passed away this PM- \"natural death\". For the last several days, she has been feeling unwell and not eating/drinking much and vomiting \"coffee ground\" and bilious material. Sister called rescue squad x 3 since 2 days ago; but the patient refused care. Rescue squad was called again this PM, who found that the patient was in cardiac arrest and was unable to revive. Patient has the diagnosis of schizophrenia with history of admission to hospital..

## 2020-08-20 ENCOUNTER — DOCUMENTATION ONLY (OUTPATIENT)
Dept: FAMILY MEDICINE CLINIC | Facility: CLINIC | Age: 55
End: 2020-08-20

## 2020-08-21 DIAGNOSIS — F20.9 CHRONIC SCHIZOPHRENIA (HCC): ICD-10-CM

## 2024-03-26 NOTE — PROGRESS NOTES
Pt dd not respond to mult msgs sent to connect for visit. General Sunscreen Counseling: I recommended a broad spectrum sunscreen with a SPF of 30 or higher.  I explained that SPF 30 sunscreens block approximately 97 percent of the sun's harmful rays.  Sunscreens should be applied at least 15 minutes prior to expected sun exposure and then every 2 hours after that as long as sun exposure continues. If swimming or exercising sunscreen should be reapplied every 45 minutes to an hour after getting wet or sweating.  One ounce, or the equivalent of a shot glass full of sunscreen, is adequate to protect the skin not covered by a bathing suit. I also recommended a lip balm with a sunscreen as well. Sun protective clothing can be used in lieu of sunscreen but must be worn the entire time you are exposed to the sun's rays. Detail Level: Detailed